# Patient Record
Sex: MALE | Race: OTHER | NOT HISPANIC OR LATINO | URBAN - METROPOLITAN AREA
[De-identification: names, ages, dates, MRNs, and addresses within clinical notes are randomized per-mention and may not be internally consistent; named-entity substitution may affect disease eponyms.]

---

## 2019-01-22 ENCOUNTER — EMERGENCY (EMERGENCY)
Facility: HOSPITAL | Age: 34
LOS: 1 days | Discharge: HOME | End: 2019-01-22
Attending: EMERGENCY MEDICINE
Payer: MEDICAID

## 2019-01-22 VITALS
RESPIRATION RATE: 16 BRPM | OXYGEN SATURATION: 99 % | HEART RATE: 70 BPM | HEIGHT: 66 IN | SYSTOLIC BLOOD PRESSURE: 146 MMHG | TEMPERATURE: 98 F | DIASTOLIC BLOOD PRESSURE: 90 MMHG | WEIGHT: 149.91 LBS

## 2019-01-22 VITALS
DIASTOLIC BLOOD PRESSURE: 84 MMHG | TEMPERATURE: 98 F | SYSTOLIC BLOOD PRESSURE: 132 MMHG | OXYGEN SATURATION: 99 % | HEART RATE: 74 BPM | RESPIRATION RATE: 18 BRPM

## 2019-01-22 LAB
FLU A RESULT: DETECTED
FLU A RESULT: DETECTED
FLUAV AG NPH QL: DETECTED
FLUBV AG NPH QL: SIGNIFICANT CHANGE UP
RSV RESULT: SIGNIFICANT CHANGE UP
RSV RNA RESP QL NAA+PROBE: SIGNIFICANT CHANGE UP

## 2019-01-22 PROCEDURE — 71046 X-RAY EXAM CHEST 2 VIEWS: CPT | Mod: 26

## 2019-01-22 PROCEDURE — 99283 EMERGENCY DEPT VISIT LOW MDM: CPT

## 2019-01-22 PROCEDURE — 87631 RESP VIRUS 3-5 TARGETS: CPT

## 2019-01-22 PROCEDURE — 71046 X-RAY EXAM CHEST 2 VIEWS: CPT

## 2019-01-22 RX ORDER — ACETAMINOPHEN 500 MG
975 TABLET ORAL ONCE
Qty: 0 | Refills: 0 | Status: COMPLETED | OUTPATIENT
Start: 2019-01-22 | End: 2019-01-22

## 2019-01-22 RX ORDER — IBUPROFEN 200 MG
600 TABLET ORAL ONCE
Qty: 0 | Refills: 0 | Status: COMPLETED | OUTPATIENT
Start: 2019-01-22 | End: 2019-01-22

## 2019-01-22 RX ADMIN — Medication 600 MILLIGRAM(S): at 20:36

## 2019-01-22 RX ADMIN — Medication 975 MILLIGRAM(S): at 21:36

## 2019-01-22 RX ADMIN — Medication 600 MILLIGRAM(S): at 21:36

## 2019-01-22 RX ADMIN — Medication 975 MILLIGRAM(S): at 20:36

## 2019-01-22 NOTE — ED ADULT NURSE NOTE - NSIMPLEMENTINTERV_GEN_ALL_ED
Implemented All Universal Safety Interventions:  [place] to call system. Call bell, personal items and telephone within reach. Instruct patient to call for assistance. Room bathroom lighting operational. Non-slip footwear when patient is off stretcher. Physically safe environment: no spills, clutter or unnecessary equipment. Stretcher in lowest position, wheels locked, appropriate side rails in place.

## 2019-01-22 NOTE — ED PROVIDER NOTE - MEDICAL DECISION MAKING DETAILS
Marcia MOFFETT: 32 yo M with no chronic medical problems here for 3-4 days of cough, body aches, fevers, chills. No travel. No sick contacts. Did not get the flu shot. Exam: NAD, RRR, lungs CTA, abd soft, nt, nd skin: no rash, legs: no edema a/p: viral illness/flu, will check CXR, symptomatically treat and check flu.

## 2019-01-22 NOTE — ED PROVIDER NOTE - CARE PLAN
Principal Discharge DX:	URI, acute  Assessment and plan of treatment:	stay hydrated  Take Tylenol 650mg every 6 hrs as needed for pain.  Take Motrin 400-600mg every 6 hrs as needed for pain. Take with food   Follow up with your Primary Care Physician within the next 2-3 days  Bring a copy of your test results with you to your appointment  Continue your current medication regimen  Return to the Emergency Room if you experience new or worsening symptoms Principal Discharge DX:	Influenza A  Assessment and plan of treatment:	stay hydrated  Take Tylenol 650mg every 6 hrs as needed for pain.  Take Motrin 400-600mg every 6 hrs as needed for pain. Take with food   Follow up with your Primary Care Physician within the next 2-3 days  Bring a copy of your test results with you to your appointment  Continue your current medication regimen  Return to the Emergency Room if you experience new or worsening symptoms

## 2019-01-22 NOTE — ED PROVIDER NOTE - NSFOLLOWUPINSTRUCTIONS_ED_ALL_ED_FT
stay hydrated  Take Tylenol 650mg every 6 hrs as needed for pain.  Take Motrin 400-600mg every 6 hrs as needed for pain. Take with food   Follow up with your Primary Care Physician within the next 2-3 days  Bring a copy of your test results with you to your appointment  Continue your current medication regimen  Return to the Emergency Room if you experience new or worsening symptoms

## 2019-01-22 NOTE — ED ADULT NURSE NOTE - OBJECTIVE STATEMENT
33 year old male presented to ED with c/o of cough x4 days. pt denies CP, SOB, nausea/vomiting, numbness/tingling, chills, dizziness, headache, blurred vision, neuro intact. pt a&ox3, lung sounds clear, heart rate regular, abdomen soft nontender nondistended to palp. skin intact. pt currently resting in bed comfortably with significant other at bedside. Will continue to monitor and assess while offering support and reassurance.

## 2019-01-22 NOTE — ED PROVIDER NOTE - OBJECTIVE STATEMENT
32 y/o male otherwise healthy who presents to the ED for 3-4 days of cough, congestion, and significant body aches. states subjective fevers bur didn't measure. no travel or sick contacts. did not get flu shot this year. no rash, vomiting, diarrhea.

## 2019-01-22 NOTE — ED ADULT NURSE NOTE - CHPI ED NUR SYMPTOMS NEG
no decreased eating/drinking/no weakness/no vomiting/no dizziness/no pain/no nausea/no chills/no tingling

## 2022-01-27 NOTE — ED ADULT TRIAGE NOTE - ARRIVAL FROM
Quality 226: Preventive Care And Screening: Tobacco Use: Screening And Cessation Intervention: Patient screened for tobacco use and is an ex/non-smoker
Detail Level: Detailed
Quality 431: Preventive Care And Screening: Unhealthy Alcohol Use - Screening: Patient identified as an unhealthy alcohol user when screened for unhealthy alcohol use using a systematic screening method and received brief counseling
Quality 130: Documentation Of Current Medications In The Medical Record: Current Medications Documented
Home

## 2022-03-30 ENCOUNTER — EMERGENCY (EMERGENCY)
Facility: HOSPITAL | Age: 37
LOS: 1 days | Discharge: HOME | End: 2022-03-30
Attending: EMERGENCY MEDICINE
Payer: MEDICAID

## 2022-03-30 VITALS
SYSTOLIC BLOOD PRESSURE: 127 MMHG | OXYGEN SATURATION: 99 % | HEART RATE: 93 BPM | TEMPERATURE: 98 F | RESPIRATION RATE: 18 BRPM | WEIGHT: 141.98 LBS | HEIGHT: 66 IN | DIASTOLIC BLOOD PRESSURE: 89 MMHG

## 2022-03-30 VITALS
HEART RATE: 90 BPM | SYSTOLIC BLOOD PRESSURE: 128 MMHG | DIASTOLIC BLOOD PRESSURE: 88 MMHG | OXYGEN SATURATION: 99 % | RESPIRATION RATE: 18 BRPM | TEMPERATURE: 98 F

## 2022-03-30 LAB
ALBUMIN SERPL ELPH-MCNC: 4.5 G/DL — SIGNIFICANT CHANGE UP (ref 3.3–5)
ALP SERPL-CCNC: 74 U/L — SIGNIFICANT CHANGE UP (ref 40–120)
ALT FLD-CCNC: 92 U/L — HIGH (ref 10–45)
ANION GAP SERPL CALC-SCNC: 12 MMOL/L — SIGNIFICANT CHANGE UP (ref 5–17)
AST SERPL-CCNC: 38 U/L — SIGNIFICANT CHANGE UP (ref 10–40)
BASOPHILS # BLD AUTO: 0.08 K/UL — SIGNIFICANT CHANGE UP (ref 0–0.2)
BASOPHILS NFR BLD AUTO: 0.9 % — SIGNIFICANT CHANGE UP (ref 0–2)
BILIRUB SERPL-MCNC: 0.5 MG/DL — SIGNIFICANT CHANGE UP (ref 0.2–1.2)
BUN SERPL-MCNC: 16 MG/DL — SIGNIFICANT CHANGE UP (ref 7–23)
CALCIUM SERPL-MCNC: 10 MG/DL — SIGNIFICANT CHANGE UP (ref 8.4–10.5)
CHLORIDE SERPL-SCNC: 102 MMOL/L — SIGNIFICANT CHANGE UP (ref 96–108)
CO2 SERPL-SCNC: 25 MMOL/L — SIGNIFICANT CHANGE UP (ref 22–31)
CREAT SERPL-MCNC: 0.83 MG/DL — SIGNIFICANT CHANGE UP (ref 0.5–1.3)
EGFR: 117 ML/MIN/1.73M2 — SIGNIFICANT CHANGE UP
EOSINOPHIL # BLD AUTO: 0.27 K/UL — SIGNIFICANT CHANGE UP (ref 0–0.5)
EOSINOPHIL NFR BLD AUTO: 2.9 % — SIGNIFICANT CHANGE UP (ref 0–6)
GLUCOSE SERPL-MCNC: 81 MG/DL — SIGNIFICANT CHANGE UP (ref 70–99)
HCT VFR BLD CALC: 37.9 % — LOW (ref 39–50)
HGB BLD-MCNC: 12.3 G/DL — LOW (ref 13–17)
IMM GRANULOCYTES NFR BLD AUTO: 0.9 % — SIGNIFICANT CHANGE UP (ref 0–1.5)
LYMPHOCYTES # BLD AUTO: 3.09 K/UL — SIGNIFICANT CHANGE UP (ref 1–3.3)
LYMPHOCYTES # BLD AUTO: 33.6 % — SIGNIFICANT CHANGE UP (ref 13–44)
MCHC RBC-ENTMCNC: 29.8 PG — SIGNIFICANT CHANGE UP (ref 27–34)
MCHC RBC-ENTMCNC: 32.5 GM/DL — SIGNIFICANT CHANGE UP (ref 32–36)
MCV RBC AUTO: 91.8 FL — SIGNIFICANT CHANGE UP (ref 80–100)
MONOCYTES # BLD AUTO: 0.8 K/UL — SIGNIFICANT CHANGE UP (ref 0–0.9)
MONOCYTES NFR BLD AUTO: 8.7 % — SIGNIFICANT CHANGE UP (ref 2–14)
NEUTROPHILS # BLD AUTO: 4.87 K/UL — SIGNIFICANT CHANGE UP (ref 1.8–7.4)
NEUTROPHILS NFR BLD AUTO: 53 % — SIGNIFICANT CHANGE UP (ref 43–77)
NRBC # BLD: 0 /100 WBCS — SIGNIFICANT CHANGE UP (ref 0–0)
PLATELET # BLD AUTO: 516 K/UL — HIGH (ref 150–400)
POTASSIUM SERPL-MCNC: 4.1 MMOL/L — SIGNIFICANT CHANGE UP (ref 3.5–5.3)
POTASSIUM SERPL-SCNC: 4.1 MMOL/L — SIGNIFICANT CHANGE UP (ref 3.5–5.3)
PROT SERPL-MCNC: 7.5 G/DL — SIGNIFICANT CHANGE UP (ref 6–8.3)
RBC # BLD: 4.13 M/UL — LOW (ref 4.2–5.8)
RBC # FLD: 13.2 % — SIGNIFICANT CHANGE UP (ref 10.3–14.5)
SODIUM SERPL-SCNC: 139 MMOL/L — SIGNIFICANT CHANGE UP (ref 135–145)
WBC # BLD: 9.19 K/UL — SIGNIFICANT CHANGE UP (ref 3.8–10.5)
WBC # FLD AUTO: 9.19 K/UL — SIGNIFICANT CHANGE UP (ref 3.8–10.5)

## 2022-03-30 PROCEDURE — 99284 EMERGENCY DEPT VISIT MOD MDM: CPT | Mod: 25

## 2022-03-30 PROCEDURE — 36415 COLL VENOUS BLD VENIPUNCTURE: CPT

## 2022-03-30 PROCEDURE — 73630 X-RAY EXAM OF FOOT: CPT

## 2022-03-30 PROCEDURE — 73610 X-RAY EXAM OF ANKLE: CPT

## 2022-03-30 PROCEDURE — 73590 X-RAY EXAM OF LOWER LEG: CPT

## 2022-03-30 PROCEDURE — 87040 BLOOD CULTURE FOR BACTERIA: CPT

## 2022-03-30 PROCEDURE — U0003: CPT

## 2022-03-30 PROCEDURE — 73630 X-RAY EXAM OF FOOT: CPT | Mod: 26,RT

## 2022-03-30 PROCEDURE — 73610 X-RAY EXAM OF ANKLE: CPT | Mod: 26,RT

## 2022-03-30 PROCEDURE — 29515 APPLICATION SHORT LEG SPLINT: CPT

## 2022-03-30 PROCEDURE — 73590 X-RAY EXAM OF LOWER LEG: CPT | Mod: 26,RT

## 2022-03-30 PROCEDURE — U0005: CPT

## 2022-03-30 PROCEDURE — 85025 COMPLETE CBC W/AUTO DIFF WBC: CPT

## 2022-03-30 PROCEDURE — 80053 COMPREHEN METABOLIC PANEL: CPT

## 2022-03-30 RX ORDER — ACETAMINOPHEN 500 MG
975 TABLET ORAL ONCE
Refills: 0 | Status: COMPLETED | OUTPATIENT
Start: 2022-03-30 | End: 2022-03-30

## 2022-03-30 RX ADMIN — Medication 975 MILLIGRAM(S): at 22:34

## 2022-03-30 RX ADMIN — Medication 975 MILLIGRAM(S): at 22:17

## 2022-03-30 NOTE — ED PROVIDER NOTE - NS ED ATTENDING STATEMENT MOD
This was a shared visit with the ERIKA. I reviewed and verified the documentation and independently performed the documented:

## 2022-03-30 NOTE — ED PROVIDER NOTE - NSFOLLOWUPINSTRUCTIONS_ED_ALL_ED_FT
1. Follow up with  your PCP within 2-3 days. Follow up with orthopedic or podiatry within 1 week.     [facility] Orthopedic Surgery  Orthopedic Surgery  [address], 3rd & 4th [place] [address]  Phone: [phone]  Fax:        [facility]  Podiatry  [address] [facility] [address] [place] [address]  Phone: [phone]  Fax:     2. Keep splint clean and dry. Do NOT wet. Keep leg elevated. Use ice as needed. Rest. Use crutches to ambulate.   3. Take Ibuprofen (i.e. Motrin, Advil) 600mg every 8 hrs for pain as needed. Take with food.   May alternate with Acetminophen (Tylenol) 650mg every 6 hours for pain as needed.  4. Return to the emergency department if you develop worsening pain, swelling, numbness, tingling, loss of sensation or any other concerning symptoms.

## 2022-03-30 NOTE — ED ADULT NURSE REASSESSMENT NOTE - NS ED NURSE REASSESS COMMENT FT1
Pt seen by Vince.  LUIS FELIPE established and blood specimens obtained and sent to lab.  Medications administered as per provider orders.  Pt educated on plan of care and process.  Pending being seen in main ED.

## 2022-03-30 NOTE — ED PROCEDURE NOTE - CPROC ED TIME OUT STATEMENT1
“Patient's name, , procedure and correct site were confirmed during the [place] Timeout.”
“Patient's name, , procedure and correct site were confirmed during the [place] Timeout.”

## 2022-03-30 NOTE — ED PROVIDER NOTE - PHYSICAL EXAMINATION
CONSTITUTIONAL: Patient is awake, alert and oriented x 3.   HEAD: NCAT  EYES: PERRL bilaterally,   ENT: Airway patent, Nasal mucosa clear.   NECK: Supple, No   LUNGS: CTA B/L, no wheezes, rhonci or rales  HEART: RRR.+S1S2 no murmurs,   MSK: (+) edema and ttp to right anterior foot and shin, (+) ecchymosis to right foot,    SKIN: (+) multiple abrasions to right shin, (+) stitches intact to multiple lacerations;   NEURO:  No focal deficits,
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
## 2022-03-30 NOTE — ED PROVIDER NOTE - NSFOLLOWUPCLINICS_GEN_ALL_ED_FT
[facility] Orthopedic Surgery  Orthopedic Surgery  [address], [address]rd & 4th [place] [address]  Phone: [phone]  Fax:     [facility]  Podiatry  [address] [facility] - 3rd [place] [address]  Phone: [phone]  Fax:

## 2022-03-30 NOTE — ED PROVIDER NOTE - PATIENT PORTAL LINK FT
You can access the FollowMyHealth Patient Portal offered by [facility] by registering at the following website: [email or web address]. By joining [employer]’s FollowMyHealth portal, you will also be able to view your health information using other applications (apps) compatible with our system.

## 2022-03-30 NOTE — ED PROVIDER NOTE - CARE PLAN
1 Principal Discharge DX:	Fracture of fibula   Principal Discharge DX:	Fracture of fibula  Goal:	proximal, right side
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
## 2022-03-31 LAB — SARS-COV-2 RNA SPEC QL NAA+PROBE: SIGNIFICANT CHANGE UP

## 2022-04-05 LAB
CULTURE RESULTS: SIGNIFICANT CHANGE UP
CULTURE RESULTS: SIGNIFICANT CHANGE UP
SPECIMEN SOURCE: SIGNIFICANT CHANGE UP
SPECIMEN SOURCE: SIGNIFICANT CHANGE UP

## 2022-04-11 PROBLEM — Z00.00 ENCOUNTER FOR PREVENTIVE HEALTH EXAMINATION: Status: ACTIVE | Noted: 2022-04-11

## 2022-04-13 ENCOUNTER — OUTPATIENT (OUTPATIENT)
Dept: OUTPATIENT SERVICES | Facility: HOSPITAL | Age: 37
LOS: 1 days | End: 2022-04-13
Payer: MEDICAID

## 2022-04-13 ENCOUNTER — APPOINTMENT (OUTPATIENT)
Dept: PODIATRY | Facility: HOSPITAL | Age: 37
End: 2022-04-13
Payer: MEDICAID

## 2022-04-13 VITALS
HEIGHT: 67 IN | DIASTOLIC BLOOD PRESSURE: 88 MMHG | SYSTOLIC BLOOD PRESSURE: 127 MMHG | HEART RATE: 72 BPM | WEIGHT: 142 LBS | BODY MASS INDEX: 22.29 KG/M2 | TEMPERATURE: 97.4 F

## 2022-04-13 DIAGNOSIS — S82.831S OTHER FRACTURE OF UPPER AND LOWER END OF RIGHT FIBULA, SEQUELA: ICD-10-CM

## 2022-04-13 DIAGNOSIS — M79.609 PAIN IN UNSPECIFIED LIMB: ICD-10-CM

## 2022-04-13 DIAGNOSIS — S82.63XA DISPLACED FRACTURE OF LATERAL MALLEOLUS OF UNSPECIFIED FIBULA, INITIAL ENCOUNTER FOR CLOSED FRACTURE: ICD-10-CM

## 2022-04-13 DIAGNOSIS — I25.2 OLD MYOCARDIAL INFARCTION: ICD-10-CM

## 2022-04-13 PROBLEM — I25.10 ATHEROSCLEROTIC HEART DISEASE OF NATIVE CORONARY ARTERY WITHOUT ANGINA PECTORIS: Chronic | Status: ACTIVE | Noted: 2022-03-30

## 2022-04-13 PROCEDURE — 99202 OFFICE O/P NEW SF 15 MIN: CPT

## 2022-04-13 PROCEDURE — G0463: CPT
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
## 2022-04-13 NOTE — REASON FOR VISIT
[Initial Visit] : an initial visit for [Ankle Pain] : ankle pain [Foot Pain] : foot pain [FreeTextEntry2] : SONDRA brandt fibular fracture

## 2022-04-13 NOTE — PHYSICAL EXAM
[2+] : right foot dorsalis pedis 2+ [Sensation] : the sensory exam was normal to light touch and pinprick [No Focal Deficits] : no focal deficits [Oriented To Time, Place, And Person] : oriented to person, place, and time [Impaired Insight] : insight and judgment were intact [Memory Recent] : recent memory was not impaired [Affect] : the affect was normal [Ankle Swelling (On Exam)] : not present [Varicose Veins Of Lower Extremities] : not present [] : not present [de-identified] : R ankle with pain on palpation to the medial and lateral border, mild pain on palpation to the proximal fibular neck. Ankle ROM intact with 4/5 muscle strength to the R ankle.  [FreeTextEntry1] : R lateral malleolus with dry stable eschar with no clinical signs of infection. L foot with no clinical signs of infection or any open lesions.  [Vibration Dec.] : normal vibratory sensation at the level of the toes [Position Sense Dec.] : normal position sense at the level of the toes [Diminished Throughout Right Foot] : normal sensation with monofilament testing throughout right foot [Diminished Throughout Left Foot] : normal sensation with monofilament testing throughout left foot

## 2022-04-27 ENCOUNTER — APPOINTMENT (OUTPATIENT)
Dept: PODIATRY | Facility: HOSPITAL | Age: 37
End: 2022-04-27
Payer: MEDICAID

## 2022-04-27 ENCOUNTER — RESULT REVIEW (OUTPATIENT)
Age: 37
End: 2022-04-27

## 2022-04-27 ENCOUNTER — OUTPATIENT (OUTPATIENT)
Dept: OUTPATIENT SERVICES | Facility: HOSPITAL | Age: 37
LOS: 1 days | End: 2022-04-27
Payer: MEDICAID

## 2022-04-27 VITALS
HEART RATE: 84 BPM | WEIGHT: 143 LBS | SYSTOLIC BLOOD PRESSURE: 120 MMHG | TEMPERATURE: 97.2 F | DIASTOLIC BLOOD PRESSURE: 87 MMHG | RESPIRATION RATE: 14 BRPM | BODY MASS INDEX: 22.44 KG/M2 | HEIGHT: 67 IN

## 2022-04-27 DIAGNOSIS — S82.831S OTHER FRACTURE OF UPPER AND LOWER END OF RIGHT FIBULA, SEQUELA: ICD-10-CM

## 2022-04-27 DIAGNOSIS — M79.609 PAIN IN UNSPECIFIED LIMB: ICD-10-CM

## 2022-04-27 DIAGNOSIS — S82.63XA DISPLACED FRACTURE OF LATERAL MALLEOLUS OF UNSPECIFIED FIBULA, INITIAL ENCOUNTER FOR CLOSED FRACTURE: ICD-10-CM

## 2022-04-27 PROCEDURE — 73590 X-RAY EXAM OF LOWER LEG: CPT | Mod: 26,RT

## 2022-04-27 PROCEDURE — 73610 X-RAY EXAM OF ANKLE: CPT

## 2022-04-27 PROCEDURE — G0463: CPT

## 2022-04-27 PROCEDURE — 73610 X-RAY EXAM OF ANKLE: CPT | Mod: 26,RT

## 2022-04-27 PROCEDURE — 99212 OFFICE O/P EST SF 10 MIN: CPT

## 2022-04-27 PROCEDURE — 73590 X-RAY EXAM OF LOWER LEG: CPT

## 2022-04-27 NOTE — PHYSICAL EXAM
[2+] : right foot dorsalis pedis 2+ [Sensation] : the sensory exam was normal to light touch and pinprick [No Focal Deficits] : no focal deficits [Oriented To Time, Place, And Person] : oriented to person, place, and time [Impaired Insight] : insight and judgment were intact [Affect] : the affect was normal [Memory Recent] : recent memory was not impaired [Ankle Swelling (On Exam)] : not present [Varicose Veins Of Lower Extremities] : not present [] : not present [de-identified] : R ankle with pain on palpation to the medial and lateral border, mild pain on palpation to the proximal fibular neck. Ankle ROM intact with 4/5 muscle strength to the R ankle.  [FreeTextEntry1] : R lateral malleolus with dry stable eschar with no clinical signs of infection. L foot with no clinical signs of infection or any open lesions.  [Vibration Dec.] : normal vibratory sensation at the level of the toes [Position Sense Dec.] : normal position sense at the level of the toes [Diminished Throughout Right Foot] : normal sensation with monofilament testing throughout right foot [Diminished Throughout Left Foot] : normal sensation with monofilament testing throughout left foot
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
## 2022-05-11 ENCOUNTER — APPOINTMENT (OUTPATIENT)
Dept: PODIATRY | Facility: HOSPITAL | Age: 37
End: 2022-05-11
Payer: MEDICAID

## 2022-05-11 ENCOUNTER — OUTPATIENT (OUTPATIENT)
Dept: OUTPATIENT SERVICES | Facility: HOSPITAL | Age: 37
LOS: 1 days | End: 2022-05-11
Payer: MEDICAID

## 2022-05-11 ENCOUNTER — RESULT REVIEW (OUTPATIENT)
Age: 37
End: 2022-05-11

## 2022-05-11 VITALS
DIASTOLIC BLOOD PRESSURE: 83 MMHG | SYSTOLIC BLOOD PRESSURE: 156 MMHG | WEIGHT: 143 LBS | RESPIRATION RATE: 14 BRPM | TEMPERATURE: 97 F | HEIGHT: 67 IN | BODY MASS INDEX: 22.44 KG/M2 | HEART RATE: 73 BPM

## 2022-05-11 DIAGNOSIS — S82.831S OTHER FRACTURE OF UPPER AND LOWER END OF RIGHT FIBULA, SEQUELA: ICD-10-CM

## 2022-05-11 DIAGNOSIS — M79.609 PAIN IN UNSPECIFIED LIMB: ICD-10-CM

## 2022-05-11 DIAGNOSIS — S82.63XA DISPLACED FRACTURE OF LATERAL MALLEOLUS OF UNSPECIFIED FIBULA, INITIAL ENCOUNTER FOR CLOSED FRACTURE: ICD-10-CM

## 2022-05-11 PROCEDURE — 73590 X-RAY EXAM OF LOWER LEG: CPT | Mod: 26,RT

## 2022-05-11 PROCEDURE — 73610 X-RAY EXAM OF ANKLE: CPT | Mod: 26,RT

## 2022-05-11 PROCEDURE — 99212 OFFICE O/P EST SF 10 MIN: CPT

## 2022-05-11 PROCEDURE — G0463: CPT

## 2022-05-11 PROCEDURE — 73590 X-RAY EXAM OF LOWER LEG: CPT

## 2022-05-11 PROCEDURE — 73610 X-RAY EXAM OF ANKLE: CPT
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
## 2022-05-11 NOTE — PHYSICAL EXAM
[2+] : right foot dorsalis pedis 2+ [Sensation] : the sensory exam was normal to light touch and pinprick [No Focal Deficits] : no focal deficits [Oriented To Time, Place, And Person] : oriented to person, place, and time [Impaired Insight] : insight and judgment were intact [Affect] : the affect was normal [Memory Recent] : recent memory was not impaired [Ankle Swelling (On Exam)] : not present [Varicose Veins Of Lower Extremities] : not present [] : not present [de-identified] : R ankle with pain on palpation to the medial and lateral border, no pain on palpation to the proximal fibular neck. Ankle ROM intact with 4/5 muscle strength to the R ankle.  [FreeTextEntry1] : R lateral malleolus with dry stable eschar with no clinical signs of infection. L foot with no clinical signs of infection or any open lesions.  [Vibration Dec.] : normal vibratory sensation at the level of the toes [Position Sense Dec.] : normal position sense at the level of the toes [Diminished Throughout Right Foot] : normal sensation with monofilament testing throughout right foot [Diminished Throughout Left Foot] : normal sensation with monofilament testing throughout left foot
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
## 2022-05-25 ENCOUNTER — RESULT REVIEW (OUTPATIENT)
Age: 37
End: 2022-05-25

## 2022-05-25 ENCOUNTER — APPOINTMENT (OUTPATIENT)
Dept: PODIATRY | Facility: HOSPITAL | Age: 37
End: 2022-05-25
Payer: MEDICAID

## 2022-05-25 ENCOUNTER — OUTPATIENT (OUTPATIENT)
Dept: OUTPATIENT SERVICES | Facility: HOSPITAL | Age: 37
LOS: 1 days | End: 2022-05-25
Payer: MEDICAID

## 2022-05-25 VITALS
BODY MASS INDEX: 27.48 KG/M2 | SYSTOLIC BLOOD PRESSURE: 113 MMHG | DIASTOLIC BLOOD PRESSURE: 78 MMHG | HEIGHT: 60 IN | HEART RATE: 66 BPM | TEMPERATURE: 97.1 F | WEIGHT: 140 LBS

## 2022-05-25 DIAGNOSIS — S82.831S OTHER FRACTURE OF UPPER AND LOWER END OF RIGHT FIBULA, SEQUELA: ICD-10-CM

## 2022-05-25 DIAGNOSIS — S82.63XA DISPLACED FRACTURE OF LATERAL MALLEOLUS OF UNSPECIFIED FIBULA, INITIAL ENCOUNTER FOR CLOSED FRACTURE: ICD-10-CM

## 2022-05-25 DIAGNOSIS — M79.609 PAIN IN UNSPECIFIED LIMB: ICD-10-CM

## 2022-05-25 PROCEDURE — 73590 X-RAY EXAM OF LOWER LEG: CPT

## 2022-05-25 PROCEDURE — 73610 X-RAY EXAM OF ANKLE: CPT

## 2022-05-25 PROCEDURE — 73610 X-RAY EXAM OF ANKLE: CPT | Mod: 26,RT

## 2022-05-25 PROCEDURE — 99212 OFFICE O/P EST SF 10 MIN: CPT

## 2022-05-25 PROCEDURE — G0463: CPT

## 2022-05-25 PROCEDURE — 73590 X-RAY EXAM OF LOWER LEG: CPT | Mod: 26,RT

## 2022-05-25 NOTE — PHYSICAL EXAM
[2+] : right foot dorsalis pedis 2+ [Sensation] : the sensory exam was normal to light touch and pinprick [No Focal Deficits] : no focal deficits [Oriented To Time, Place, And Person] : oriented to person, place, and time [Impaired Insight] : insight and judgment were intact [Affect] : the affect was normal [Memory Recent] : recent memory was not impaired [Ankle Swelling (On Exam)] : not present [Varicose Veins Of Lower Extremities] : not present [] : not present [de-identified] : R ankle with pain on palpation to the medial and lateral border, no pain on palpation to the proximal fibular neck. Ankle ROM intact with 4/5 muscle strength to the R ankle.  [FreeTextEntry1] : R lateral malleolus with dry stable eschar with no clinical signs of infection. L foot with no clinical signs of infection or any open lesions.  [Vibration Dec.] : normal vibratory sensation at the level of the toes [Position Sense Dec.] : normal position sense at the level of the toes [Diminished Throughout Right Foot] : normal sensation with monofilament testing throughout right foot [Diminished Throughout Left Foot] : normal sensation with monofilament testing throughout left foot
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
## 2022-05-25 NOTE — REASON FOR VISIT
[Ankle Pain] : ankle pain [Foot Pain] : foot pain [Follow-Up Visit] : a follow-up visit for [FreeTextEntry2] : SONDRA brandt fibular fracture

## 2022-06-08 ENCOUNTER — OUTPATIENT (OUTPATIENT)
Dept: OUTPATIENT SERVICES | Facility: HOSPITAL | Age: 37
LOS: 1 days | End: 2022-06-08
Payer: MEDICAID

## 2022-06-08 ENCOUNTER — APPOINTMENT (OUTPATIENT)
Dept: PODIATRY | Facility: HOSPITAL | Age: 37
End: 2022-06-08

## 2022-06-08 ENCOUNTER — RESULT REVIEW (OUTPATIENT)
Age: 37
End: 2022-06-08

## 2022-06-08 VITALS
HEART RATE: 71 BPM | WEIGHT: 140 LBS | DIASTOLIC BLOOD PRESSURE: 79 MMHG | HEIGHT: 60 IN | SYSTOLIC BLOOD PRESSURE: 116 MMHG | BODY MASS INDEX: 27.48 KG/M2 | TEMPERATURE: 97.8 F

## 2022-06-08 DIAGNOSIS — S82.831S OTHER FRACTURE OF UPPER AND LOWER END OF RIGHT FIBULA, SEQUELA: ICD-10-CM

## 2022-06-08 DIAGNOSIS — S82.63XA DISPLACED FRACTURE OF LATERAL MALLEOLUS OF UNSPECIFIED FIBULA, INITIAL ENCOUNTER FOR CLOSED FRACTURE: ICD-10-CM

## 2022-06-08 DIAGNOSIS — M79.609 PAIN IN UNSPECIFIED LIMB: ICD-10-CM

## 2022-06-08 PROCEDURE — G0463: CPT

## 2022-06-08 PROCEDURE — 73590 X-RAY EXAM OF LOWER LEG: CPT

## 2022-06-08 PROCEDURE — 73590 X-RAY EXAM OF LOWER LEG: CPT | Mod: 26,RT

## 2022-06-08 NOTE — PHYSICAL EXAM
[2+] : right foot dorsalis pedis 2+ [Sensation] : the sensory exam was normal to light touch and pinprick [No Focal Deficits] : no focal deficits [Oriented To Time, Place, And Person] : oriented to person, place, and time [Impaired Insight] : insight and judgment were intact [Affect] : the affect was normal [Memory Recent] : recent memory was not impaired [Ankle Swelling (On Exam)] : not present [Varicose Veins Of Lower Extremities] : not present [] : not present [de-identified] : R ankle with pain on palpation to the medial and lateral border, no pain on palpation to the proximal fibular neck. Ankle ROM intact with 4/5 muscle strength to the R ankle.  [FreeTextEntry1] : R lateral malleolus with dry stable eschar with no clinical signs of infection. L foot with no clinical signs of infection or any open lesions.  [Vibration Dec.] : normal vibratory sensation at the level of the toes [Position Sense Dec.] : normal position sense at the level of the toes [Diminished Throughout Right Foot] : normal sensation with monofilament testing throughout right foot [Diminished Throughout Left Foot] : normal sensation with monofilament testing throughout left foot
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
## 2022-06-08 NOTE — REASON FOR VISIT
[Follow-Up Visit] : a follow-up visit for [Ankle Pain] : ankle pain [Foot Pain] : foot pain [FreeTextEntry2] : SONDRA rbandt fibular fracture

## 2022-06-22 ENCOUNTER — RESULT REVIEW (OUTPATIENT)
Age: 37
End: 2022-06-22

## 2022-06-22 ENCOUNTER — APPOINTMENT (OUTPATIENT)
Dept: PODIATRY | Facility: HOSPITAL | Age: 37
End: 2022-06-22
Payer: MEDICAID

## 2022-06-22 ENCOUNTER — OUTPATIENT (OUTPATIENT)
Dept: OUTPATIENT SERVICES | Facility: HOSPITAL | Age: 37
LOS: 1 days | End: 2022-06-22
Payer: MEDICAID

## 2022-06-22 VITALS
RESPIRATION RATE: 16 BRPM | TEMPERATURE: 98 F | HEART RATE: 70 BPM | WEIGHT: 140 LBS | HEIGHT: 67 IN | BODY MASS INDEX: 21.97 KG/M2 | SYSTOLIC BLOOD PRESSURE: 116 MMHG | DIASTOLIC BLOOD PRESSURE: 81 MMHG

## 2022-06-22 DIAGNOSIS — M79.609 PAIN IN UNSPECIFIED LIMB: ICD-10-CM

## 2022-06-22 DIAGNOSIS — S82.63XA DISPLACED FRACTURE OF LATERAL MALLEOLUS OF UNSPECIFIED FIBULA, INITIAL ENCOUNTER FOR CLOSED FRACTURE: ICD-10-CM

## 2022-06-22 DIAGNOSIS — S82.831S OTHER FRACTURE OF UPPER AND LOWER END OF RIGHT FIBULA, SEQUELA: ICD-10-CM

## 2022-06-22 PROCEDURE — 73610 X-RAY EXAM OF ANKLE: CPT

## 2022-06-22 PROCEDURE — 99212 OFFICE O/P EST SF 10 MIN: CPT

## 2022-06-22 PROCEDURE — G0463: CPT

## 2022-06-22 PROCEDURE — 73610 X-RAY EXAM OF ANKLE: CPT | Mod: 26,RT

## 2022-06-22 PROCEDURE — 73590 X-RAY EXAM OF LOWER LEG: CPT | Mod: 26,RT

## 2022-06-22 PROCEDURE — 73590 X-RAY EXAM OF LOWER LEG: CPT
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
## 2022-06-22 NOTE — PHYSICAL EXAM
[2+] : right foot dorsalis pedis 2+ [Sensation] : the sensory exam was normal to light touch and pinprick [No Focal Deficits] : no focal deficits [Oriented To Time, Place, And Person] : oriented to person, place, and time [Impaired Insight] : insight and judgment were intact [Affect] : the affect was normal [Memory Recent] : recent memory was not impaired [Ankle Swelling (On Exam)] : not present [Varicose Veins Of Lower Extremities] : not present [] : not present [de-identified] : R ankle with mild pain on palpation to the medial and lateral border, mild pain on palpation to the proximal fibular neck. Ankle ROM intact with 5/5 muscle strength to the R ankle.  [FreeTextEntry1] : R lateral malleolus with dry stable eschar with no clinical signs of infection. L foot with no clinical signs of infection or any open lesions.  [Vibration Dec.] : normal vibratory sensation at the level of the toes [Position Sense Dec.] : normal position sense at the level of the toes [Diminished Throughout Right Foot] : normal sensation with monofilament testing throughout right foot [Diminished Throughout Left Foot] : normal sensation with monofilament testing throughout left foot
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
## 2022-06-22 NOTE — REASON FOR VISIT
[Follow-Up Visit] : a follow-up visit for [Ankle Pain] : ankle pain [Foot Pain] : foot pain [FreeTextEntry2] : SONDRA brandt fibular fracture

## 2022-07-06 ENCOUNTER — RESULT REVIEW (OUTPATIENT)
Age: 37
End: 2022-07-06

## 2022-07-06 ENCOUNTER — APPOINTMENT (OUTPATIENT)
Dept: PODIATRY | Facility: HOSPITAL | Age: 37
End: 2022-07-06

## 2022-07-06 ENCOUNTER — OUTPATIENT (OUTPATIENT)
Dept: OUTPATIENT SERVICES | Facility: HOSPITAL | Age: 37
LOS: 1 days | End: 2022-07-06
Payer: MEDICAID

## 2022-07-06 VITALS
SYSTOLIC BLOOD PRESSURE: 121 MMHG | RESPIRATION RATE: 14 BRPM | HEIGHT: 67 IN | TEMPERATURE: 96.6 F | DIASTOLIC BLOOD PRESSURE: 83 MMHG | HEART RATE: 69 BPM | WEIGHT: 140 LBS | BODY MASS INDEX: 21.97 KG/M2

## 2022-07-06 DIAGNOSIS — S82.63XA DISPLACED FRACTURE OF LATERAL MALLEOLUS OF UNSPECIFIED FIBULA, INITIAL ENCOUNTER FOR CLOSED FRACTURE: ICD-10-CM

## 2022-07-06 DIAGNOSIS — S82.831S OTHER FRACTURE OF UPPER AND LOWER END OF RIGHT FIBULA, SEQUELA: ICD-10-CM

## 2022-07-06 DIAGNOSIS — M79.609 PAIN IN UNSPECIFIED LIMB: ICD-10-CM

## 2022-07-06 PROCEDURE — 73610 X-RAY EXAM OF ANKLE: CPT | Mod: 26,RT

## 2022-07-06 PROCEDURE — 99212 OFFICE O/P EST SF 10 MIN: CPT

## 2022-07-06 PROCEDURE — G0463: CPT

## 2022-07-06 PROCEDURE — 73610 X-RAY EXAM OF ANKLE: CPT

## 2022-07-06 NOTE — PHYSICAL EXAM
[2+] : right foot dorsalis pedis 2+ [Sensation] : the sensory exam was normal to light touch and pinprick [No Focal Deficits] : no focal deficits [Oriented To Time, Place, And Person] : oriented to person, place, and time [Impaired Insight] : insight and judgment were intact [Affect] : the affect was normal [Memory Recent] : recent memory was not impaired [Ankle Swelling (On Exam)] : not present [Varicose Veins Of Lower Extremities] : not present [] : not present [de-identified] : R ankle with mild pain on palpation to the medial and lateral border, mild pain on palpation to the proximal fibular neck. Ankle ROM intact with 5/5 muscle strength to the R ankle.  [FreeTextEntry1] : R lateral malleolus with dry stable eschar with no clinical signs of infection. L foot with no clinical signs of infection or any open lesions.  [Vibration Dec.] : normal vibratory sensation at the level of the toes [Position Sense Dec.] : normal position sense at the level of the toes [Diminished Throughout Right Foot] : normal sensation with monofilament testing throughout right foot [Diminished Throughout Left Foot] : normal sensation with monofilament testing throughout left foot
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
## 2022-07-20 ENCOUNTER — OUTPATIENT (OUTPATIENT)
Dept: OUTPATIENT SERVICES | Facility: HOSPITAL | Age: 37
LOS: 1 days | End: 2022-07-20
Payer: MEDICAID

## 2022-07-20 ENCOUNTER — APPOINTMENT (OUTPATIENT)
Dept: PODIATRY | Facility: HOSPITAL | Age: 37
End: 2022-07-20

## 2022-07-20 VITALS
BODY MASS INDEX: 21.97 KG/M2 | RESPIRATION RATE: 14 BRPM | WEIGHT: 140 LBS | DIASTOLIC BLOOD PRESSURE: 81 MMHG | TEMPERATURE: 97.6 F | SYSTOLIC BLOOD PRESSURE: 112 MMHG | HEIGHT: 67 IN | HEART RATE: 76 BPM

## 2022-07-20 DIAGNOSIS — S82.63XA DISPLACED FRACTURE OF LATERAL MALLEOLUS OF UNSPECIFIED FIBULA, INITIAL ENCOUNTER FOR CLOSED FRACTURE: ICD-10-CM

## 2022-07-20 DIAGNOSIS — M79.609 PAIN IN UNSPECIFIED LIMB: ICD-10-CM

## 2022-07-20 DIAGNOSIS — S82.831S OTHER FRACTURE OF UPPER AND LOWER END OF RIGHT FIBULA, SEQUELA: ICD-10-CM

## 2022-07-20 PROCEDURE — G0463: CPT
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
## 2022-07-20 NOTE — PHYSICAL EXAM
[2+] : right foot dorsalis pedis 2+ [Sensation] : the sensory exam was normal to light touch and pinprick [No Focal Deficits] : no focal deficits [Oriented To Time, Place, And Person] : oriented to person, place, and time [Impaired Insight] : insight and judgment were intact [Affect] : the affect was normal [Memory Recent] : recent memory was not impaired [Ankle Swelling (On Exam)] : not present [Varicose Veins Of Lower Extremities] : not present [] : not present [de-identified] : R ankle with mild pain on palpation to the medial and lateral border, mild pain on palpation to the proximal fibular neck. Ankle ROM intact with 5/5 muscle strength to the R ankle.  [FreeTextEntry1] : R lateral malleolus with no clinical signs of infection. L foot with no clinical signs of infection or any open lesions.  [Vibration Dec.] : normal vibratory sensation at the level of the toes [Position Sense Dec.] : normal position sense at the level of the toes [Diminished Throughout Right Foot] : normal sensation with monofilament testing throughout right foot [Diminished Throughout Left Foot] : normal sensation with monofilament testing throughout left foot

## 2022-08-17 ENCOUNTER — APPOINTMENT (OUTPATIENT)
Dept: PODIATRY | Facility: HOSPITAL | Age: 37
End: 2022-08-17

## 2022-11-28 ENCOUNTER — INPATIENT (INPATIENT)
Facility: HOSPITAL | Age: 37
LOS: 0 days | Discharge: HOME | DRG: 87 | End: 2022-11-29
Attending: NEUROLOGICAL SURGERY | Admitting: NEUROLOGICAL SURGERY
Payer: MEDICAID

## 2022-11-28 VITALS
TEMPERATURE: 98 F | HEART RATE: 76 BPM | DIASTOLIC BLOOD PRESSURE: 74 MMHG | HEIGHT: 66 IN | RESPIRATION RATE: 17 BRPM | OXYGEN SATURATION: 98 % | WEIGHT: 147.05 LBS | SYSTOLIC BLOOD PRESSURE: 153 MMHG

## 2022-11-28 DIAGNOSIS — S06.33AA CONTUSION AND LACERATION OF CEREBRUM, UNSPECIFIED, WITH LOSS OF CONSCIOUSNESS STATUS UNKNOWN, INITIAL ENCOUNTER: ICD-10-CM

## 2022-11-28 LAB
ALBUMIN SERPL ELPH-MCNC: 4.2 G/DL — SIGNIFICANT CHANGE UP (ref 3.3–5)
ALP SERPL-CCNC: 65 U/L — SIGNIFICANT CHANGE UP (ref 40–120)
ALT FLD-CCNC: 22 U/L — SIGNIFICANT CHANGE UP (ref 10–45)
ANION GAP SERPL CALC-SCNC: 11 MMOL/L — SIGNIFICANT CHANGE UP (ref 5–17)
APTT BLD: 33 SEC — SIGNIFICANT CHANGE UP (ref 27.5–35.5)
AST SERPL-CCNC: 14 U/L — SIGNIFICANT CHANGE UP (ref 10–40)
BASOPHILS # BLD AUTO: 0.04 K/UL — SIGNIFICANT CHANGE UP (ref 0–0.2)
BASOPHILS NFR BLD AUTO: 0.4 % — SIGNIFICANT CHANGE UP (ref 0–2)
BILIRUB SERPL-MCNC: 0.5 MG/DL — SIGNIFICANT CHANGE UP (ref 0.2–1.2)
BLD GP AB SCN SERPL QL: NEGATIVE — SIGNIFICANT CHANGE UP
BUN SERPL-MCNC: 6 MG/DL — LOW (ref 7–23)
CALCIUM SERPL-MCNC: 8.9 MG/DL — SIGNIFICANT CHANGE UP (ref 8.4–10.5)
CHLORIDE SERPL-SCNC: 106 MMOL/L — SIGNIFICANT CHANGE UP (ref 96–108)
CO2 SERPL-SCNC: 25 MMOL/L — SIGNIFICANT CHANGE UP (ref 22–31)
CREAT SERPL-MCNC: 0.76 MG/DL — SIGNIFICANT CHANGE UP (ref 0.5–1.3)
EGFR: 119 ML/MIN/1.73M2 — SIGNIFICANT CHANGE UP
EOSINOPHIL # BLD AUTO: 0.09 K/UL — SIGNIFICANT CHANGE UP (ref 0–0.5)
EOSINOPHIL NFR BLD AUTO: 0.8 % — SIGNIFICANT CHANGE UP (ref 0–6)
FLUAV AG NPH QL: SIGNIFICANT CHANGE UP
FLUBV AG NPH QL: SIGNIFICANT CHANGE UP
GLUCOSE SERPL-MCNC: 96 MG/DL — SIGNIFICANT CHANGE UP (ref 70–99)
HCT VFR BLD CALC: 39.4 % — SIGNIFICANT CHANGE UP (ref 39–50)
HGB BLD-MCNC: 13.3 G/DL — SIGNIFICANT CHANGE UP (ref 13–17)
IMM GRANULOCYTES NFR BLD AUTO: 0.4 % — SIGNIFICANT CHANGE UP (ref 0–0.9)
INR BLD: 1.13 RATIO — SIGNIFICANT CHANGE UP (ref 0.88–1.16)
LYMPHOCYTES # BLD AUTO: 28.4 % — SIGNIFICANT CHANGE UP (ref 13–44)
LYMPHOCYTES # BLD AUTO: 3.12 K/UL — SIGNIFICANT CHANGE UP (ref 1–3.3)
MCHC RBC-ENTMCNC: 30 PG — SIGNIFICANT CHANGE UP (ref 27–34)
MCHC RBC-ENTMCNC: 33.8 GM/DL — SIGNIFICANT CHANGE UP (ref 32–36)
MCV RBC AUTO: 88.9 FL — SIGNIFICANT CHANGE UP (ref 80–100)
MONOCYTES # BLD AUTO: 0.96 K/UL — HIGH (ref 0–0.9)
MONOCYTES NFR BLD AUTO: 8.8 % — SIGNIFICANT CHANGE UP (ref 2–14)
NEUTROPHILS # BLD AUTO: 6.72 K/UL — SIGNIFICANT CHANGE UP (ref 1.8–7.4)
NEUTROPHILS NFR BLD AUTO: 61.2 % — SIGNIFICANT CHANGE UP (ref 43–77)
NRBC # BLD: 0 /100 WBCS — SIGNIFICANT CHANGE UP (ref 0–0)
PA ADP PRP-ACNC: 275 PRU — SIGNIFICANT CHANGE UP (ref 194–417)
PLATELET # BLD AUTO: 295 K/UL — SIGNIFICANT CHANGE UP (ref 150–400)
PLATELET RESPONSE ASPIRIN RESULT: 482 ARU — SIGNIFICANT CHANGE UP (ref 350–700)
POTASSIUM SERPL-MCNC: 3.5 MMOL/L — SIGNIFICANT CHANGE UP (ref 3.5–5.3)
POTASSIUM SERPL-SCNC: 3.5 MMOL/L — SIGNIFICANT CHANGE UP (ref 3.5–5.3)
PROT SERPL-MCNC: 7 G/DL — SIGNIFICANT CHANGE UP (ref 6–8.3)
PROTHROM AB SERPL-ACNC: 13 SEC — SIGNIFICANT CHANGE UP (ref 10.5–13.4)
RBC # BLD: 4.43 M/UL — SIGNIFICANT CHANGE UP (ref 4.2–5.8)
RBC # FLD: 12.6 % — SIGNIFICANT CHANGE UP (ref 10.3–14.5)
RH IG SCN BLD-IMP: POSITIVE — SIGNIFICANT CHANGE UP
RSV RNA NPH QL NAA+NON-PROBE: SIGNIFICANT CHANGE UP
SARS-COV-2 RNA SPEC QL NAA+PROBE: SIGNIFICANT CHANGE UP
SODIUM SERPL-SCNC: 142 MMOL/L — SIGNIFICANT CHANGE UP (ref 135–145)
WBC # BLD: 10.97 K/UL — HIGH (ref 3.8–10.5)
WBC # FLD AUTO: 10.97 K/UL — HIGH (ref 3.8–10.5)

## 2022-11-28 PROCEDURE — 70450 CT HEAD/BRAIN W/O DYE: CPT | Mod: 26,MA

## 2022-11-28 PROCEDURE — 99232 SBSQ HOSP IP/OBS MODERATE 35: CPT

## 2022-11-28 PROCEDURE — 99291 CRITICAL CARE FIRST HOUR: CPT

## 2022-11-28 PROCEDURE — 70450 CT HEAD/BRAIN W/O DYE: CPT | Mod: 26,77

## 2022-11-28 RX ORDER — SODIUM CHLORIDE 9 MG/ML
1000 INJECTION INTRAMUSCULAR; INTRAVENOUS; SUBCUTANEOUS ONCE
Refills: 0 | Status: COMPLETED | OUTPATIENT
Start: 2022-11-28 | End: 2022-11-28

## 2022-11-28 RX ORDER — METOPROLOL TARTRATE 50 MG
25 TABLET ORAL DAILY
Refills: 0 | Status: DISCONTINUED | OUTPATIENT
Start: 2022-11-28 | End: 2022-11-29

## 2022-11-28 RX ORDER — SENNA PLUS 8.6 MG/1
2 TABLET ORAL AT BEDTIME
Refills: 0 | Status: DISCONTINUED | OUTPATIENT
Start: 2022-11-28 | End: 2022-11-29

## 2022-11-28 RX ORDER — OXYCODONE HYDROCHLORIDE 5 MG/1
10 TABLET ORAL EVERY 4 HOURS
Refills: 0 | Status: DISCONTINUED | OUTPATIENT
Start: 2022-11-28 | End: 2022-11-29

## 2022-11-28 RX ORDER — ACETAMINOPHEN 500 MG
1000 TABLET ORAL ONCE
Refills: 0 | Status: COMPLETED | OUTPATIENT
Start: 2022-11-28 | End: 2022-11-28

## 2022-11-28 RX ORDER — ATORVASTATIN CALCIUM 80 MG/1
80 TABLET, FILM COATED ORAL AT BEDTIME
Refills: 0 | Status: DISCONTINUED | OUTPATIENT
Start: 2022-11-28 | End: 2022-11-29

## 2022-11-28 RX ORDER — METOPROLOL TARTRATE 50 MG
1 TABLET ORAL
Qty: 0 | Refills: 0 | DISCHARGE

## 2022-11-28 RX ORDER — METOCLOPRAMIDE HCL 10 MG
10 TABLET ORAL ONCE
Refills: 0 | Status: COMPLETED | OUTPATIENT
Start: 2022-11-28 | End: 2022-11-28

## 2022-11-28 RX ORDER — ACETAMINOPHEN 500 MG
650 TABLET ORAL EVERY 6 HOURS
Refills: 0 | Status: DISCONTINUED | OUTPATIENT
Start: 2022-11-28 | End: 2022-11-29

## 2022-11-28 RX ORDER — ONDANSETRON 8 MG/1
4 TABLET, FILM COATED ORAL EVERY 6 HOURS
Refills: 0 | Status: DISCONTINUED | OUTPATIENT
Start: 2022-11-28 | End: 2022-11-29

## 2022-11-28 RX ORDER — OXYCODONE HYDROCHLORIDE 5 MG/1
5 TABLET ORAL EVERY 4 HOURS
Refills: 0 | Status: DISCONTINUED | OUTPATIENT
Start: 2022-11-28 | End: 2022-11-29

## 2022-11-28 RX ORDER — POLYETHYLENE GLYCOL 3350 17 G/17G
17 POWDER, FOR SOLUTION ORAL DAILY
Refills: 0 | Status: DISCONTINUED | OUTPATIENT
Start: 2022-11-28 | End: 2022-11-29

## 2022-11-28 RX ORDER — LEVETIRACETAM 250 MG/1
500 TABLET, FILM COATED ORAL
Refills: 0 | Status: DISCONTINUED | OUTPATIENT
Start: 2022-11-28 | End: 2022-11-29

## 2022-11-28 RX ADMIN — Medication 10 MILLIGRAM(S): at 12:49

## 2022-11-28 RX ADMIN — SODIUM CHLORIDE 1000 MILLILITER(S): 9 INJECTION INTRAMUSCULAR; INTRAVENOUS; SUBCUTANEOUS at 12:49

## 2022-11-28 RX ADMIN — OXYCODONE HYDROCHLORIDE 10 MILLIGRAM(S): 5 TABLET ORAL at 21:40

## 2022-11-28 RX ADMIN — LEVETIRACETAM 500 MILLIGRAM(S): 250 TABLET, FILM COATED ORAL at 15:10

## 2022-11-28 RX ADMIN — Medication 400 MILLIGRAM(S): at 12:48
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
## 2022-11-28 NOTE — ED PROVIDER NOTE - NS ED ROS FT
CONSTITUTIONAL: No fevers, chills, fatigue, dizziness, weakness, unexpected weight change  EYES: No double vision, blurry vision  ENT: No ear pain, nasal congestion, runny nose, sore throat  CV: No chest pain, palpitations  PULM: No cough, shortness of breath  GI: No abdominal pain, nausea, vomiting, diarrhea, constipation  : No dysuria, polyuria, hematuria  SKIN: No rashes, swelling  MSK: No muscle aches  NEURO: + headache, no paresthesias  PSYCHIATRIC: Denies suicidal, homicidal ideations. No auditory, visual, tactile hallucinations

## 2022-11-28 NOTE — ED PROVIDER NOTE - PROGRESS NOTE DETAILS
CTH shows likely hemorrhagic contusion of inferior frontal lobe. paged neurosurgery who recommends keppra 500mg PO BID. States to get rpt CTH in 4 hours post first CTH. Neurosurgery to place order for "ARU" and "PRU".   Patient states last aspirin use was 2 days ago, prior to fall. States HA improved with tylenol and reglan. Will continue to monitor. Admitted to neurosurgery.
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
## 2022-11-28 NOTE — ED ADULT NURSE NOTE - NSIMPLEMENTINTERV_GEN_ALL_ED
Implemented All Universal Safety Interventions:  [place] to call system. Call bell, personal items and telephone within reach. Instruct patient to call for assistance. Room bathroom lighting operational. Non-slip footwear when patient is off stretcher. Physically safe environment: no spills, clutter or unnecessary equipment. Stretcher in lowest position, wheels locked, appropriate side rails in place.

## 2022-11-28 NOTE — ED PROVIDER NOTE - PHYSICAL EXAMINATION
GENERAL: Vital signs are within normal limits  EYES: Conjunctiva noninjected or pale, sclera anicteric  HENT: NC/AT, moist mucous membranes  NECK: Supple, trachea midline  LUNG: Nonlabored respirations, no wheezes, rales  CV: RRR, Pulses- Radial/dorsalis pedis: 2+ bilateral and equal  ABDOMEN: Nondistended, nontender  MSK: No visible deformities, nontender extremities  SKIN: No rashes, bruises  NEURO: AAOx4 (to person, place, time, event), no tremor, steady gait, normal strength and sensation in all extremities, normal coordination  PSYCH: Normal mood and affect

## 2022-11-28 NOTE — ED PROVIDER NOTE - CRITICAL CARE ATTENDING CONTRIBUTION TO CARE
I have personally provided 30 minutes of critical care concurrently with the resident(s) and/or ACP(s), excluding time spent on separate procedures.

## 2022-11-29 ENCOUNTER — TRANSCRIPTION ENCOUNTER (OUTPATIENT)
Age: 37
End: 2022-11-29

## 2022-11-29 VITALS
SYSTOLIC BLOOD PRESSURE: 128 MMHG | DIASTOLIC BLOOD PRESSURE: 81 MMHG | OXYGEN SATURATION: 99 % | TEMPERATURE: 98 F | HEART RATE: 77 BPM | RESPIRATION RATE: 18 BRPM

## 2022-11-29 PROCEDURE — 36415 COLL VENOUS BLD VENIPUNCTURE: CPT

## 2022-11-29 PROCEDURE — 80053 COMPREHEN METABOLIC PANEL: CPT

## 2022-11-29 PROCEDURE — 99285 EMERGENCY DEPT VISIT HI MDM: CPT | Mod: 25

## 2022-11-29 PROCEDURE — 86901 BLOOD TYPING SEROLOGIC RH(D): CPT

## 2022-11-29 PROCEDURE — 85610 PROTHROMBIN TIME: CPT

## 2022-11-29 PROCEDURE — 96375 TX/PRO/DX INJ NEW DRUG ADDON: CPT

## 2022-11-29 PROCEDURE — 86850 RBC ANTIBODY SCREEN: CPT

## 2022-11-29 PROCEDURE — 87637 SARSCOV2&INF A&B&RSV AMP PRB: CPT

## 2022-11-29 PROCEDURE — 86900 BLOOD TYPING SEROLOGIC ABO: CPT

## 2022-11-29 PROCEDURE — 85025 COMPLETE CBC W/AUTO DIFF WBC: CPT

## 2022-11-29 PROCEDURE — 70450 CT HEAD/BRAIN W/O DYE: CPT | Mod: 26

## 2022-11-29 PROCEDURE — 96374 THER/PROPH/DIAG INJ IV PUSH: CPT

## 2022-11-29 PROCEDURE — 70450 CT HEAD/BRAIN W/O DYE: CPT

## 2022-11-29 PROCEDURE — 85730 THROMBOPLASTIN TIME PARTIAL: CPT

## 2022-11-29 PROCEDURE — G0378: CPT

## 2022-11-29 PROCEDURE — 85576 BLOOD PLATELET AGGREGATION: CPT

## 2022-11-29 RX ORDER — ATORVASTATIN CALCIUM 80 MG/1
1 TABLET, FILM COATED ORAL
Qty: 0 | Refills: 0 | DISCHARGE

## 2022-11-29 RX ORDER — ACETAMINOPHEN 500 MG
2 TABLET ORAL
Qty: 0 | Refills: 0 | DISCHARGE
Start: 2022-11-29

## 2022-11-29 RX ORDER — LEVETIRACETAM 250 MG/1
1 TABLET, FILM COATED ORAL
Qty: 12 | Refills: 0
Start: 2022-11-29 | End: 2022-12-04

## 2022-11-29 RX ORDER — ATORVASTATIN CALCIUM 80 MG/1
1 TABLET, FILM COATED ORAL
Qty: 0 | Refills: 0 | DISCHARGE
Start: 2022-11-29

## 2022-11-29 RX ADMIN — SENNA PLUS 2 TABLET(S): 8.6 TABLET ORAL at 00:27

## 2022-11-29 RX ADMIN — Medication 25 MILLIGRAM(S): at 05:16

## 2022-11-29 RX ADMIN — Medication 650 MILLIGRAM(S): at 05:45

## 2022-11-29 RX ADMIN — Medication 650 MILLIGRAM(S): at 05:15

## 2022-11-29 RX ADMIN — ATORVASTATIN CALCIUM 80 MILLIGRAM(S): 80 TABLET, FILM COATED ORAL at 00:25

## 2022-11-29 RX ADMIN — LEVETIRACETAM 500 MILLIGRAM(S): 250 TABLET, FILM COATED ORAL at 05:16

## 2022-11-29 NOTE — DISCHARGE NOTE PROVIDER - NSDCCPCAREPLAN_GEN_ALL_CORE_FT
PRINCIPAL DISCHARGE DIAGNOSIS  Diagnosis: Focal hemorrhagic contusion of cerebrum  Assessment and Plan of Treatment: Continue taking Keppra (Levetiracetam) 500 mg twice a day for a total of 7 days.   You may take Tylenol (acetaminophen) as needed for pain   PLEASE FOLLOW UP WITH NEUROSURGEON DR. NEVILLE IN 1 WEEK   Please make all necessary appointments and follow up. Please DO NOT take any Aspirin and NSAIDs (Advil, Aleve, Motrin, Ibuprofen) until cleared by your Neurosurgeon. Please DO NOT do any heavy lifting, bending, twisting and straining. Please come to the emergency room for any of the following: altered mental status, seizures, pain uncontrolled by pain medications, fevers, chest pain and shortness of breath.  You have been started on a new medication, keppra.  Keppra helps control and prevent seizures.  The most common side effects include diarrhea or constipation, excessive sleepiness, irritability and mood changes.  Rare and sometimes serious side effects include rash.        SECONDARY DISCHARGE DIAGNOSES  Diagnosis: CAD (coronary artery disease)  Assessment and Plan of Treatment: DO NOT take Aspirin until cleared by your Neurosurgeon Dr. Neville   Please follow up with your Cardiologist in 1-2 weeks   Please follow up with your primary care provider in 1-2 weeks       Diagnosis: Hypertension  Assessment and Plan of Treatment: Continue taking Metoprolol Succinate ER 25 mg daily       Diagnosis: HLD (hyperlipidemia)  Assessment and Plan of Treatment: Continue taking atorvastatin 80 mg daily   Please follow up with your Primary Care Provider in 1-2 weeks     PRINCIPAL DISCHARGE DIAGNOSIS  Diagnosis: Focal hemorrhagic contusion of cerebrum  Assessment and Plan of Treatment: Continue taking Keppra (Levetiracetam) 500 mg twice a day for a total of 7 days.   You may take Tylenol (acetaminophen) as needed for pain   PLEASE FOLLOW UP WITH NEUROSURGEON DR. NEVILLE IN 1 WEEK   Please make all necessary appointments and follow up.   **Please DO NOT take any Aspirin and NSAIDs (Advil, Aleve, Motrin, Ibuprofen) until cleared by your Neurosurgeon. Please DO NOT do any heavy lifting, bending, twisting and straining. Please come to the emergency room for any of the following: altered mental status, seizures, pain uncontrolled by pain medications, fevers, chest pain and shortness of breath.  You have been started on a new medication, keppra.  Keppra helps control and prevent seizures.  The most common side effects include diarrhea or constipation, excessive sleepiness, irritability and mood changes.  Rare and sometimes serious side effects include rash.        SECONDARY DISCHARGE DIAGNOSES  Diagnosis: CAD (coronary artery disease)  Assessment and Plan of Treatment: DO NOT take Aspirin until cleared by your Neurosurgeon Dr. Neville   Please follow up with your Cardiologist in 1-2 weeks   Please follow up with your primary care provider in 1-2 weeks       Diagnosis: Hypertension  Assessment and Plan of Treatment: Continue taking Metoprolol Succinate ER 25 mg daily   Please follow up with Primary care provider in 1-2 weeks       Diagnosis: HLD (hyperlipidemia)  Assessment and Plan of Treatment: Continue taking atorvastatin 80 mg daily   Please follow up with your Primary Care Provider in 1-2 weeks

## 2022-11-29 NOTE — DISCHARGE NOTE PROVIDER - NSDCMRMEDTOKEN_GEN_ALL_CORE_FT
atorvastatin 80 mg oral tablet: 1 tab(s) orally once a day  metoprolol succinate 25 mg oral tablet, extended release: 1 tab(s) orally once a day   acetaminophen 325 mg oral tablet: 2 tab(s) orally every 6 hours, As needed, Temp greater or equal to 38C (100.4F), Mild Pain (1 - 3)  Lipitor 80 mg oral tablet: 1 tab(s) orally once a day (at bedtime)  metoprolol succinate 25 mg oral tablet, extended release: 1 tab(s) orally once a day

## 2022-11-29 NOTE — DISCHARGE NOTE NURSING/CASE MANAGEMENT/SOCIAL WORK - PATIENT PORTAL LINK FT
You can access the FollowMyHealth Patient Portal offered by [facility] by registering at the following website: [email or web address]. By joining [employer]’s FollowMyHealth portal, you will also be able to view your health information using other applications (apps) compatible with our system.
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
## 2022-11-29 NOTE — PROGRESS NOTE ADULT - ASSESSMENT
ASSESSMENT: 37M on ASA 81 (last taken 2 days ago, stopped plavix 3 weeks ago), hx of HTN, HLD, cardiac stents x2 (7/18/2020, family hx) s/p fall Friday night in the setting of alcohol intoxication.  States he banged his head but did not suffer loss of consciousness as far as he knows; does not remember what happened. Vomited multiple times yesterday. CT shows bifrontal hemorrhage contusion (L>R)    NEURO:  Neurochecks q4H   CT head interval 4H stable. Repeat CT Head this AM- follow up read   Keppra 500 BID seizure ppx   pain control prn   PT eval     PULM:  Incentive spirometry, Mobilize as tolerated  >98% on RA     CV:  SBP goal    hx of cardiac stents, continue holding ASA   continue home metoprolol ER 25 qd   continue home lipitor 80 mg     RENAL:  IVL, voiding   BUN/Cr stable     GI:  Regular diet   Bowel regimen Miralax & Senna     ENDO:   Goal euglycemia (-180)    HEME/ONC:  VTE prophylaxis: [x] SCDs  [x] hold chemoprophylaxis due to b/l frontal contusion     ID:  afebrile  covid neg 11/28     plan d/w Dr. Russ   Spectra [id number]     ASSESSMENT: 37M on ASA 81 (last taken 2 days ago, stopped plavix 3 weeks ago), hx of HTN, HLD, cardiac stents x2 (7/18/2020, family hx) s/p fall Friday night in the setting of alcohol intoxication w/o LOC. CT shows bifrontal hemorrhage contusion (L>R)    NEURO:  Neurochecks q4H   CT head interval 4H stable. Repeat CT Head this AM- follow up read   Keppra 500 BID seizure ppx   pain control prn   PT eval     PULM:  Incentive spirometry, Mobilize as tolerated  >98% on RA     CV:  SBP goal    hx of cardiac stents, continue holding ASA   continue home metoprolol ER 25 qd   continue home lipitor 80 mg     RENAL:  IVL, voiding   BUN/Cr stable     GI:  Regular diet   Bowel regimen Miralax & Senna     ENDO:   Goal euglycemia (-180)    HEME/ONC:  VTE prophylaxis: [x] SCDs  [x] hold chemoprophylaxis due to b/l frontal contusion     ID:  afebrile  covid neg 11/28     plan d/w Dr. Russ   Spectra [id number]     ASSESSMENT: 37M on ASA 81 (last taken 2 days ago, stopped plavix 3 weeks ago), hx of HTN, HLD, cardiac stents x2 (7/18/2020, family hx) s/p fall Friday night in the setting of alcohol intoxication w/o LOC. CT shows bifrontal hemorrhage contusion (L>R)    NEURO:  Neurochecks q4H   CT head interval 4H stable. Repeat CT Head this AM- follow up read stable.   Keppra 500 BID seizure ppx   pain control prn     PULM:  Incentive spirometry, Mobilize as tolerated  sat >98% on RA     CV:  SBP goal  mmHg  hx of cardiac stents, continue holding ASA   continue home metoprolol ER 25 qd   continue home lipitor 80 mg     RENAL:  IVL, voiding   BUN/Cr stable     GI:  Regular diet   Bowel regimen Miralax & Senna     ENDO:   Goal euglycemia (-180)    HEME/ONC:  VTE prophylaxis: [x] SCDs  [x] hold chemoprophylaxis due to b/l frontal contusion     ID:  afebrile  covid neg 11/28     plan d/w Dr. Russ   Spectra [id number]

## 2022-11-29 NOTE — DISCHARGE NOTE NURSING/CASE MANAGEMENT/SOCIAL WORK - NSDCPEFALRISK_GEN_ALL_CORE
For information on Fall & Injury Prevention, visit: [email or web address] OR  [email or web address] OR  https://www.cdc.gov/steadi/patient.html

## 2022-11-29 NOTE — DISCHARGE NOTE PROVIDER - HOSPITAL COURSE
37M on ASA 81 (last taken 2 days ago, stopped plavix 3 weeks ago), hx of HTN, HLD, cardiac stents x2 (7/18/2020, family hx) s/p fall Friday night in the setting of alcohol intoxication.  States he hit his head but did not suffer loss of consciousness as far as he knows; does not remember what happened. Vomited multiple times yesterday. Initial CT showing bifrontal hemorrhage contusion (L>R). Interval CT Head after 4 Hours showing Similar-appearing bilateral inferior frontal hemorrhagic parenchymal contusions. The presence of acute extra-axial hemorrhage in this region is not excluded. Patient was started on Keppra 500 mg BID for seizure prophylaxis. Patients home Aspirin has been held due to concern for hemorrhagic contusion.     Repeat CT Head this AM 11/29 showing........       While admitted, patient was evaluated by Physical Therapy with recommendations of......   On the day of discharge the patient is stable and has been medically cleared for discharge by Neurosurgeon. Instructed to follow up with Neurosurgeon Dr. Christopher Russ in 1 week 37M on ASA 81 (last taken 2 days ago, stopped plavix 3 weeks ago), hx of HTN, HLD, cardiac stents x2 (7/18/2020, family hx) s/p fall Friday night in the setting of alcohol intoxication.  States he hit his head but did not suffer loss of consciousness as far as he knows; does not remember what happened. Vomited multiple times yesterday. Initial CT showing bifrontal hemorrhage contusion (L>R). Interval CT Head after 4 Hours showing Similar-appearing bilateral inferior frontal hemorrhagic parenchymal contusions. The presence of acute extra-axial hemorrhage in this region is not excluded. Patient was started on Keppra 500 mg BID for seizure prophylaxis. Patients home Aspirin has been held due to concern for hemorrhagic contusion.     Repeat CT Head this AM 11/29 showing........       While admitted  On the day of discharge  is medically and neurologically stable for discharge to home. Instructed to follow up with Neurosurgeon Dr. Christopher Russ in 1 week 37M on ASA 81 (last taken 2 days ago, stopped plavix 3 weeks ago), hx of HTN, HLD, cardiac stents x2 (7/18/2020, family hx) s/p fall Friday night in the setting of alcohol intoxication.  States he hit his head but did not suffer loss of consciousness as far as he knows; does not remember what happened. Vomited multiple times yesterday. Initial CT showing bifrontal hemorrhage contusion (L>R). Interval CT Head after 4 Hours showing Similar-appearing bilateral inferior frontal hemorrhagic parenchymal contusions. The presence of acute extra-axial hemorrhage in this region is not excluded. Patient was started on Keppra 500 mg BID for seizure prophylaxis. Patients home Aspirin has been held due to concern for hemorrhagic contusion.   Repeat CT Head this AM 11/29 showing Similar appearing bilateral inferomedial frontal hemorrhagic parenchymal contusions.  Patient reportedly ambulating in ED without difficulties. On the day of discharge  is medically and neurologically stable for discharge to home. Instructed to follow up with Neurosurgeon Dr. Christopher Russ in 1 week 37M on ASA 81 (last taken 2 days ago, stopped plavix 3 weeks ago), hx of HTN, HLD, cardiac stents x2 (7/18/2020, family hx) s/p fall Friday night in the setting of alcohol intoxication.  States he hit his head but did not suffer loss of consciousness as far as he knows; does not remember what happened. Vomited multiple times yesterday. Initial CT showing bifrontal hemorrhage contusion (L>R). Interval CT Head after 4 Hours showing Similar-appearing bilateral inferior frontal hemorrhagic parenchymal contusions. The presence of acute extra-axial hemorrhage in this region is not excluded. Patient was started on Keppra 500 mg BID for seizure prophylaxis. Patients home Aspirin has been held due to concern for hemorrhagic contusion. Repeat CT Head this AM 11/29 showing Similar appearing bilateral inferomedial frontal hemorrhagic parenchymal contusions.  Patient reportedly ambulating in ED without difficulties. On the day of discharge  is medically and neurologically stable for discharge to home. Instructed to follow up with Neurosurgeon Dr. Christopher Russ in 1 week.

## 2022-11-29 NOTE — DISCHARGE NOTE PROVIDER - CARE PROVIDER_API CALL
Christopher Russ)  Neurosurgery  [address], [address]  [place] [address]  Phone: [phone]  Fax: [phone]  Follow Up Time:

## 2022-11-29 NOTE — DISCHARGE NOTE PROVIDER - CARE PROVIDERS DIRECT ADDRESSES
[email or web address]

## 2022-12-03 DIAGNOSIS — S06.9XAA UNSPECIFIED INTRACRANIAL INJURY WITH LOSS OF CONSCIOUSNESS STATUS UNKNOWN, INITIAL ENCOUNTER: ICD-10-CM

## 2022-12-12 ENCOUNTER — APPOINTMENT (OUTPATIENT)
Dept: NEUROSURGERY | Facility: CLINIC | Age: 37
End: 2022-12-12

## 2022-12-12 ENCOUNTER — NON-APPOINTMENT (OUTPATIENT)
Age: 37
End: 2022-12-12

## 2022-12-12 VITALS
BODY MASS INDEX: 22.44 KG/M2 | DIASTOLIC BLOOD PRESSURE: 93 MMHG | HEIGHT: 67 IN | HEART RATE: 74 BPM | WEIGHT: 143 LBS | OXYGEN SATURATION: 98 % | SYSTOLIC BLOOD PRESSURE: 132 MMHG

## 2022-12-12 DIAGNOSIS — I10 ESSENTIAL (PRIMARY) HYPERTENSION: ICD-10-CM

## 2022-12-12 DIAGNOSIS — E78.5 HYPERLIPIDEMIA, UNSPECIFIED: ICD-10-CM

## 2022-12-12 PROCEDURE — 99213 OFFICE O/P EST LOW 20 MIN: CPT

## 2022-12-12 RX ORDER — METOPROLOL SUCCINATE 25 MG/1
25 TABLET, EXTENDED RELEASE ORAL
Refills: 0 | Status: ACTIVE | COMMUNITY

## 2022-12-12 RX ORDER — LEVETIRACETAM 500 MG/1
500 TABLET, FILM COATED ORAL
Refills: 0 | Status: DISCONTINUED | COMMUNITY

## 2022-12-12 RX ORDER — ATORVASTATIN CALCIUM 80 MG/1
80 TABLET, FILM COATED ORAL
Refills: 0 | Status: ACTIVE | COMMUNITY

## 2022-12-14 ENCOUNTER — APPOINTMENT (OUTPATIENT)
Dept: CT IMAGING | Facility: IMAGING CENTER | Age: 37
End: 2022-12-14

## 2022-12-14 ENCOUNTER — OUTPATIENT (OUTPATIENT)
Dept: OUTPATIENT SERVICES | Facility: HOSPITAL | Age: 37
LOS: 1 days | End: 2022-12-14
Payer: MEDICAID

## 2022-12-14 DIAGNOSIS — S06.9XAA UNSPECIFIED INTRACRANIAL INJURY WITH LOSS OF CONSCIOUSNESS STATUS UNKNOWN, INITIAL ENCOUNTER: ICD-10-CM

## 2022-12-14 DIAGNOSIS — Z00.8 ENCOUNTER FOR OTHER GENERAL EXAMINATION: ICD-10-CM

## 2022-12-14 PROCEDURE — 70450 CT HEAD/BRAIN W/O DYE: CPT | Mod: 26

## 2022-12-14 PROCEDURE — 70450 CT HEAD/BRAIN W/O DYE: CPT

## 2022-12-19 ENCOUNTER — APPOINTMENT (OUTPATIENT)
Dept: NEUROSURGERY | Facility: CLINIC | Age: 37
End: 2022-12-19

## 2022-12-19 VITALS
HEIGHT: 67 IN | DIASTOLIC BLOOD PRESSURE: 74 MMHG | WEIGHT: 143 LBS | OXYGEN SATURATION: 98 % | BODY MASS INDEX: 22.44 KG/M2 | SYSTOLIC BLOOD PRESSURE: 126 MMHG | HEART RATE: 69 BPM

## 2022-12-19 PROCEDURE — 99214 OFFICE O/P EST MOD 30 MIN: CPT

## 2022-12-19 RX ORDER — ASPIRIN ENTERIC COATED TABLETS 81 MG 81 MG/1
81 TABLET, DELAYED RELEASE ORAL DAILY
Refills: 0 | Status: DISCONTINUED | COMMUNITY
Start: 2022-05-11 | End: 2022-12-19

## 2022-12-19 NOTE — ASSESSMENT
[FreeTextEntry1] : IMPRESSION:\par \par 37 M on ASA 81 (last taken 2 days ago, stopped Plavix 3 weeks ago), hx of HTN, HLD, cardiac stents x2 (7/18/2020, family hx) s/p fall on 11/26/2022 night with no LOC in the setting of alcohol intoxication. Vomited multiple times after the accident. Initial CT showing bifrontal hemorrhage contusion (L>R). Interval. CT Head after 4 Hours showing Similar-appearing bilateral inferior frontal hemorrhagic parenchymal contusions. Today pt with improved symptoms, has a very mild headache and dizziness at the end of a full shift.  Pt neurologically intact. \par \par PLAN:\par \par Continue watching symptoms \par May resume AC at this time, Cleared for ASA.\par Pt to follow up with cardiology for further  AC management\par F/U in a month \par \par \par

## 2022-12-19 NOTE — RESULTS/DATA
[FreeTextEntry1] : EXAM: [id number] - CT BRAIN - ORDERED BY: DANNY NEVILLE\par \par \par PROCEDURE DATE: 12/14/2022\par \par \par \par INTERPRETATION: CLINICAL INDICATION: Traumatic brain injury, follow-up\par \par 5mm axial sections of the brain were obtained from base to vertex, without the intravenous administration of contrast material. Coronal and sagittal computer generated reconstructed views are available.\par \par Comparison is made with the prior CT of 11/29/2022.\par \par Lucency in the left inferior frontal lobe is identified consistent with residua from the patient's prior hemorrhagic contusion. The hemorrhage has resolved. Hemorrhage in the inferior right frontal lobe has also resolved. Ventricles and sulci are normal for the patient's age. There is no acute hemorrhage. There are no depressed skull fractures.\par \par \par IMPRESSION: Left inferior frontal lucency consistent with residua from resolving left inferior frontal hemorrhagic contusion compared with 11/29/2022. Resolved hemorrhage right inferior frontal lobe.\par

## 2022-12-19 NOTE — HISTORY OF PRESENT ILLNESS
[FreeTextEntry1] : 37 M on ASA 81 (last taken 2 days ago, stopped plavix 3 weeks ago), hx of HTN, HLD, cardiac stents x2 (7/18/2020, family hx) s/p fall on 11/26/2022 night in the setting of alcohol intoxication. States he hit his head but did not suffer oss of consciousness . Vomited multiple times after the accident. Initial CT showing bifrontal hemorrhage contusion (L>R). Interval. CT Head after 4 Hours showing Similar-appearing bilateral inferior frontal hemorrhagic parenchymal contusions. The presence of acute extra-axial hemorrhage in this region is not excluded. Patient was started on Keppra 500 mg BID for seizure prophylaxis.\par \par Today Mr. Marquez present for follow up with an other scan. He has improved symptoms. Denies headache, speech impairment, vision problems or seizures. However he still has very mild occasional dizziness and mild headache after working a whole day.  He did follow up with cardiology and was told to wait for further advised from this office regarding clearance for AC.

## 2022-12-19 NOTE — PHYSICAL EXAM
[General Appearance - Alert] : alert [General Appearance - In No Acute Distress] : in no acute distress [General Appearance - Well Nourished] : well nourished [General Appearance - Well-Appearing] : healthy appearing [Oriented To Time, Place, And Person] : oriented to person, place, and time [Impaired Insight] : insight and judgment were intact [Affect] : the affect was normal [Memory Recent] : recent memory was not impaired [Person] : oriented to person [Place] : oriented to place [Time] : oriented to time [Short Term Intact] : short term memory intact [Cranial Nerves Optic (II)] : visual acuity intact bilaterally,  pupils equal round and reactive to light [Cranial Nerves Oculomotor (III)] : extraocular motion intact [Cranial Nerves Trigeminal (V)] : facial sensation intact symmetrically [Cranial Nerves Facial (VII)] : face symmetrical [Cranial Nerves Vestibulocochlear (VIII)] : hearing was intact bilaterally [Cranial Nerves Accessory (XI - Cranial And Spinal)] : head turning and shoulder shrug symmetric [Cranial Nerves Hypoglossal (XII)] : there was no tongue deviation with protrusion [Motor Tone] : muscle tone was normal in all four extremities [Motor Strength] : muscle strength was normal in all four extremities [Involuntary Movements] : no involuntary movements were seen [Sensation Tactile Decrease] : light touch was intact [Sensation Pain / Temperature Decrease] : pain and temperature was intact [Sclera] : the sclera and conjunctiva were normal [PERRL With Normal Accommodation] : pupils were equal in size, round, reactive to light, with normal accommodation [Outer Ear] : the ears and nose were normal in appearance [Both Tympanic Membranes Were Examined] : both tympanic membranes were normal [Neck Appearance] : the appearance of the neck was normal [] : no respiratory distress [Respiration, Rhythm And Depth] : normal respiratory rhythm and effort [Apical Impulse] : the apical impulse was normal [Arterial Pulses Carotid] : carotid pulses were normal with no bruits [Abdominal Aorta] : the abdominal aorta was normal [No CVA Tenderness] : no ~M costovertebral angle tenderness [No Spinal Tenderness] : no spinal tenderness [Abnormal Walk] : normal gait [Skin Color & Pigmentation] : normal skin color and pigmentation [FreeTextEntry8] : no drift, mild imbalance at tandem

## 2022-12-25 NOTE — HISTORY OF PRESENT ILLNESS
[FreeTextEntry1] : 37 M on ASA 81 (last taken 2 days ago, stopped plavix 3 weeks ago), hx of HTN, HLD, cardiac stents x2 (7/18/2020, family hx) s/p fall on 11/26/2022  night in the setting of alcohol intoxication.  States he hit his head but did not suffer oss of consciousness . Vomited multiple times after the accident.  Initial CT showing bifrontal hemorrhage contusion (L>R). Interval.  CT Head after 4 Hours showing Similar-appearing bilateral inferior frontal hemorrhagic parenchymal contusions. The presence of acute extra-axial hemorrhage in this region is not excluded. Patient was started on Keppra 500 mg BID for seizure prophylaxis.\par \par Today Mr. Marquez present ambulatory with improved symptoms. Denies headache, speech impairment, vision problems or seizures. He still has very mild occasional headache. He completed the Keppra. He went back to work with no issues with little fatigue and tired. \par \par PCSS Concussion score: 6\par 6:none\par 5:none\par 4:none\par 3:none\par 2:none\par 1:Headache, emotional, irritability, trouble falling asleep, dizziness, difficulty remembering\par \par Level activity- 8\par \par NW Concussion Score- 4\par A lot -\par A little-headache, neck pain, dizziness, hard to fall asleep\par None--

## 2022-12-25 NOTE — RESULTS/DATA
[FreeTextEntry1] : ACC: [id number] EXAM: CT BRAIN\par \par PROCEDURE DATE: 11/29/2022\par \par \par \par INTERPRETATION: Noncontrast CT of the brain.\par \par CLINICAL INDICATION: Follow-up bilateral frontal contusions\par \par TECHNIQUE : Axial CT scanning of the brain was obtained from the skull base to the vertex without the administration of intravenous contrast. Sagittal and coronal reformats were provided.\par \par COMPARISON: CT brain 11/28/2022\par \par FINDINGS:\par \par Similar appearing bilateral inferomedial frontal hemorrhagic parenchymal contusions.\par \par No hydrocephalus. Similar minimal rightward midline shift. No effacement of basal cisterns.\par \par No displaced calvarial fracture.\par \par The visualized paranasal sinuses and mastoid air cells are clear.\par \par IMPRESSION:\par \par Similar appearing bilateral inferomedial frontal hemorrhagic parenchymal contusions.\par

## 2022-12-25 NOTE — PHYSICAL EXAM
[General Appearance - Alert] : alert [General Appearance - In No Acute Distress] : in no acute distress [General Appearance - Well Nourished] : well nourished [General Appearance - Well-Appearing] : healthy appearing [Person] : oriented to person [Place] : oriented to place [Time] : oriented to time [Short Term Intact] : short term memory intact [Cranial Nerves Optic (II)] : visual acuity intact bilaterally,  pupils equal round and reactive to light [Cranial Nerves Oculomotor (III)] : extraocular motion intact [Cranial Nerves Trigeminal (V)] : facial sensation intact symmetrically [Cranial Nerves Facial (VII)] : face symmetrical [Cranial Nerves Vestibulocochlear (VIII)] : hearing was intact bilaterally [Cranial Nerves Accessory (XI - Cranial And Spinal)] : head turning and shoulder shrug symmetric [Cranial Nerves Hypoglossal (XII)] : there was no tongue deviation with protrusion [Motor Tone] : muscle tone was normal in all four extremities [Motor Strength] : muscle strength was normal in all four extremities [Involuntary Movements] : no involuntary movements were seen [Sensation Tactile Decrease] : light touch was intact [Sensation Pain / Temperature Decrease] : pain and temperature was intact [Balance] : balance was intact [Sclera] : the sclera and conjunctiva were normal [PERRL With Normal Accommodation] : pupils were equal in size, round, reactive to light, with normal accommodation [Outer Ear] : the ears and nose were normal in appearance [Both Tympanic Membranes Were Examined] : both tympanic membranes were normal [Neck Appearance] : the appearance of the neck was normal [] : no respiratory distress [Respiration, Rhythm And Depth] : normal respiratory rhythm and effort [Apical Impulse] : the apical impulse was normal [Heart Rate And Rhythm] : heart rate was normal and rhythm regular [Arterial Pulses Carotid] : carotid pulses were normal with no bruits [Bowel Sounds] : normal bowel sounds [Abdomen Soft] : soft [No CVA Tenderness] : no ~M costovertebral angle tenderness [No Spinal Tenderness] : no spinal tenderness [Abnormal Walk] : normal gait [Skin Color & Pigmentation] : normal skin color and pigmentation [Skin Turgor] : normal skin turgor [FreeTextEntry8] : no drift, no imbalance at tandem

## 2022-12-25 NOTE — ASSESSMENT
[FreeTextEntry1] : 37 M on ASA 81 (last taken 2 days ago, stopped plavix 3 weeks ago), hx of HTN, HLD, cardiac stents x2 (7/18/2020, family hx) s/p fall on 11/26/2022 night with no LOC in the setting of alcohol intoxication.  Vomited multiple times after the accident. Initial CT showing bifrontal hemorrhage contusion (L>R). Interval. CT Head after 4 Hours showing Similar-appearing bilateral inferior frontal hemorrhagic parenchymal contusions. Patient was started on Keppra 500 mg BID for seizure prophylaxis. Today pt with  improved symptoms, has a very mild headache at 1/10. Pt neurologically intact.Concussion scores are 6/4.   He went back to work with no issues.\par \par PLAN:\par \par Continue watching symptoms \par ASA to be held until the F/U \par CT head no contrast in 1 week.\par F/U one week 12/19/2022 for antiplatelet clearance

## 2023-01-26 ENCOUNTER — APPOINTMENT (OUTPATIENT)
Dept: NEUROSURGERY | Facility: CLINIC | Age: 38
End: 2023-01-26
Payer: MEDICAID

## 2023-01-26 VITALS
WEIGHT: 143 LBS | SYSTOLIC BLOOD PRESSURE: 126 MMHG | DIASTOLIC BLOOD PRESSURE: 88 MMHG | HEIGHT: 67 IN | BODY MASS INDEX: 22.44 KG/M2 | HEART RATE: 70 BPM | OXYGEN SATURATION: 99 %

## 2023-01-26 PROCEDURE — 99214 OFFICE O/P EST MOD 30 MIN: CPT

## 2023-01-26 NOTE — PHYSICAL EXAM
[General Appearance - Alert] : alert [General Appearance - In No Acute Distress] : in no acute distress [General Appearance - Well Nourished] : well nourished [General Appearance - Well-Appearing] : healthy appearing [Oriented To Time, Place, And Person] : oriented to person, place, and time [Impaired Insight] : insight and judgment were intact [Affect] : the affect was normal [Memory Recent] : recent memory was not impaired [Person] : oriented to person [Place] : oriented to place [Time] : oriented to time [Short Term Intact] : short term memory intact [Cranial Nerves Optic (II)] : visual acuity intact bilaterally,  pupils equal round and reactive to light [Cranial Nerves Oculomotor (III)] : extraocular motion intact [Cranial Nerves Trigeminal (V)] : facial sensation intact symmetrically [Cranial Nerves Facial (VII)] : face symmetrical [Cranial Nerves Vestibulocochlear (VIII)] : hearing was intact bilaterally [Cranial Nerves Accessory (XI - Cranial And Spinal)] : head turning and shoulder shrug symmetric [Cranial Nerves Hypoglossal (XII)] : there was no tongue deviation with protrusion [Motor Tone] : muscle tone was normal in all four extremities [Motor Strength] : muscle strength was normal in all four extremities [Sensation Tactile Decrease] : light touch was intact [Sensation Pain / Temperature Decrease] : pain and temperature was intact [Balance] : balance was intact [Sclera] : the sclera and conjunctiva were normal [PERRL With Normal Accommodation] : pupils were equal in size, round, reactive to light, with normal accommodation [Outer Ear] : the ears and nose were normal in appearance [Both Tympanic Membranes Were Examined] : both tympanic membranes were normal [Neck Appearance] : the appearance of the neck was normal [] : no respiratory distress [Respiration, Rhythm And Depth] : normal respiratory rhythm and effort [Apical Impulse] : the apical impulse was normal [Heart Rate And Rhythm] : heart rate was normal and rhythm regular [Arterial Pulses Carotid] : carotid pulses were normal with no bruits [Abdominal Aorta] : the abdominal aorta was normal [No CVA Tenderness] : no ~M costovertebral angle tenderness [No Spinal Tenderness] : no spinal tenderness [Abnormal Walk] : normal gait [Involuntary Movements] : no involuntary movements were seen [Skin Color & Pigmentation] : normal skin color and pigmentation [FreeTextEntry8] : no drift, mild to moderate instability at tandem.

## 2023-01-26 NOTE — ASSESSMENT
[FreeTextEntry1] : IMPRESSION:\par \par 37 M on ASA 81 (last taken 2 days ago, stopped Plavix 3 weeks ago), hx of HTN, HLD, cardiac stents x2 (7/18/2020, family hx) s/p fall on 11/26/2022 night with no LOC in the setting of alcohol intoxication. Vomited multiple times after the accident. Initial CT showing bifrontal hemorrhage contusion (L>R). F/u CT head revealed resolution of SDH.  Cleared for ASA, had follow up with cardiology. Now C/O continued dizziness and found to have mild instability at tandem. \par \par \par PLAN:\par \par Continue watching symptoms \par STARS VT for dizziness and balance for strengthening and modalities , 2-3 times/week x 8 weeks. \par F/U in a 6 weeks for re evaluation of symptoms.\par \par

## 2023-01-26 NOTE — HISTORY OF PRESENT ILLNESS
[FreeTextEntry1] : 37 M on ASA 81 (last taken 2 days ago, stopped Plavix 3 weeks ago), hx of HTN, HLD, cardiac stents x2 (7/18/2020, family hx) s/p fall on 11/26/2022 night in the setting of alcohol intoxication. CT head in the hospital showed bifrontal hemorrhage contusion (L>R). Interval.  Mr. Marquez f/u with  for follow up with an other scan. He has improved symptoms,  he still has very mild occasional dizziness and mild headache after working a whole day. .\par \par Pt returned after a month for evaluation of symptoms.  He did follow up with cardiology and restarted ASA. Pt working whole day, doing well with no issue. However pt has momentary head ache, dizziness often when change in position. Pt also c/o no taste sense which has happened since the trauma. Pt here to  re evaluate his symptoms.

## 2023-03-13 ENCOUNTER — APPOINTMENT (OUTPATIENT)
Dept: NEUROSURGERY | Facility: CLINIC | Age: 38
End: 2023-03-13
Payer: MEDICAID

## 2023-03-13 VITALS
HEART RATE: 70 BPM | BODY MASS INDEX: 22.44 KG/M2 | DIASTOLIC BLOOD PRESSURE: 86 MMHG | SYSTOLIC BLOOD PRESSURE: 125 MMHG | HEIGHT: 67 IN | OXYGEN SATURATION: 99 % | WEIGHT: 143 LBS

## 2023-03-13 PROCEDURE — 99213 OFFICE O/P EST LOW 20 MIN: CPT

## 2023-03-22 NOTE — HISTORY OF PRESENT ILLNESS
[FreeTextEntry1] : 37 M on ASA 81 (last taken 2 days ago, stopped Plavix 3 weeks ago), hx of HTN, HLD, cardiac stents x2 (7/18/2020, family hx) s/p fall on 11/26/2022 night in the setting of alcohol intoxication. CT head in the hospital showed bifrontal hemorrhage contusion (L>R). Interval. Mr. Marquez f/u with for follow up with an other scan. He has improved symptoms, he still has very mild occasional dizziness and mild headache after working a whole day. .\par Pt returned with improved symptoms. Denies headache, dizziness or vision/hearing issues. Doing his work well. He is on ASA with cardiology whom he had recent follow up. He had follow up with ENT who did crystal removal maneuver which helped his dizziness.  He is ok working whole day, doing well with all other symptoms.

## 2023-03-22 NOTE — ASSESSMENT
[FreeTextEntry1] : IMPRESSION:\par \par 37 M on ASA 82 with  hx of HTN, HLD, cardiac stents x 2 (7/18/2020, family hx) s/p fall on 11/26/2022 night with no LOC in the setting of alcohol intoxication. Vomited multiple times after the accident. Initial CT showing bifrontal hemorrhage contusion (L>R).Pt has h/o Plavix use until few weeks before the trauma.  F/u CT head in the clinic in Dec 2022 revealed resolution of SDH and cleared for ASA and he is on. Pt had C/O continued dizziness and now resolved after ENT  did the Epley maneuver. \par \par \par PLAN: \par \par Continue watching symptoms \par F/U with cardiology for AC management\par F/U with ENT for dizzy spells if happens again.\par Precautions given\par F/U as needed. \par

## 2023-03-22 NOTE — PHYSICAL EXAM
[General Appearance - Alert] : alert [General Appearance - In No Acute Distress] : in no acute distress [General Appearance - Well Nourished] : well nourished [General Appearance - Well-Appearing] : healthy appearing [Oriented To Time, Place, And Person] : oriented to person, place, and time [Impaired Insight] : insight and judgment were intact [Affect] : the affect was normal [Memory Recent] : recent memory was not impaired [Person] : oriented to person [Place] : oriented to place [Time] : oriented to time [Short Term Intact] : short term memory intact [Cranial Nerves Optic (II)] : visual acuity intact bilaterally,  pupils equal round and reactive to light [Cranial Nerves Oculomotor (III)] : extraocular motion intact [Cranial Nerves Trigeminal (V)] : facial sensation intact symmetrically [Cranial Nerves Facial (VII)] : face symmetrical [Cranial Nerves Vestibulocochlear (VIII)] : hearing was intact bilaterally [Cranial Nerves Accessory (XI - Cranial And Spinal)] : head turning and shoulder shrug symmetric [Cranial Nerves Hypoglossal (XII)] : there was no tongue deviation with protrusion [Motor Tone] : muscle tone was normal in all four extremities [Motor Strength] : muscle strength was normal in all four extremities [Sensation Tactile Decrease] : light touch was intact [Sensation Pain / Temperature Decrease] : pain and temperature was intact [Balance] : balance was intact [Sclera] : the sclera and conjunctiva were normal [PERRL With Normal Accommodation] : pupils were equal in size, round, reactive to light, with normal accommodation [Outer Ear] : the ears and nose were normal in appearance [Both Tympanic Membranes Were Examined] : both tympanic membranes were normal [Neck Appearance] : the appearance of the neck was normal [] : no respiratory distress [Respiration, Rhythm And Depth] : normal respiratory rhythm and effort [Apical Impulse] : the apical impulse was normal [Heart Rate And Rhythm] : heart rate was normal and rhythm regular [Arterial Pulses Carotid] : carotid pulses were normal with no bruits [Abdominal Aorta] : the abdominal aorta was normal [No CVA Tenderness] : no ~M costovertebral angle tenderness [No Spinal Tenderness] : no spinal tenderness [Abnormal Walk] : normal gait [Involuntary Movements] : no involuntary movements were seen [Skin Color & Pigmentation] : normal skin color and pigmentation [FreeTextEntry8] : no drift, no imbalance in tandem

## 2024-03-28 NOTE — DISCHARGE NOTE PROVIDER - NSDCQMCOGNITION_NEU_ALL_CORE
No difficulties Detail Level: Zone Patient Specific Otc Recommendations (Will Not Stick From Patient To Patient): Mee villarreal wash

## 2024-09-17 ENCOUNTER — EMERGENCY (EMERGENCY)
Facility: HOSPITAL | Age: 39
LOS: 1 days | Discharge: HOME | End: 2024-09-17
Attending: EMERGENCY MEDICINE
Payer: COMMERCIAL

## 2024-09-17 VITALS
TEMPERATURE: 99 F | OXYGEN SATURATION: 100 % | RESPIRATION RATE: 16 BRPM | WEIGHT: 139.99 LBS | HEART RATE: 85 BPM | SYSTOLIC BLOOD PRESSURE: 134 MMHG | DIASTOLIC BLOOD PRESSURE: 92 MMHG | HEIGHT: 66 IN

## 2024-09-17 PROCEDURE — 73630 X-RAY EXAM OF FOOT: CPT

## 2024-09-17 PROCEDURE — 28430 CLTX TALUS FRACTURE W/O MNPJ: CPT | Mod: 54,LT

## 2024-09-17 PROCEDURE — 73630 X-RAY EXAM OF FOOT: CPT | Mod: 26,LT

## 2024-09-17 PROCEDURE — 73590 X-RAY EXAM OF LOWER LEG: CPT

## 2024-09-17 PROCEDURE — 29515 APPLICATION SHORT LEG SPLINT: CPT | Mod: LT

## 2024-09-17 PROCEDURE — 73610 X-RAY EXAM OF ANKLE: CPT

## 2024-09-17 PROCEDURE — 99284 EMERGENCY DEPT VISIT MOD MDM: CPT | Mod: 25

## 2024-09-17 PROCEDURE — 73610 X-RAY EXAM OF ANKLE: CPT | Mod: 26,LT

## 2024-09-17 PROCEDURE — 99284 EMERGENCY DEPT VISIT MOD MDM: CPT | Mod: 57

## 2024-09-17 PROCEDURE — 73590 X-RAY EXAM OF LOWER LEG: CPT | Mod: 26,LT

## 2024-09-18 VITALS
HEART RATE: 81 BPM | TEMPERATURE: 98 F | RESPIRATION RATE: 17 BRPM | OXYGEN SATURATION: 99 % | SYSTOLIC BLOOD PRESSURE: 126 MMHG | DIASTOLIC BLOOD PRESSURE: 82 MMHG

## 2024-09-18 NOTE — ED PROVIDER NOTE - ATTENDING APP SHARED VISIT CONTRIBUTION OF CARE
I have reviewed this note, the presenting symptoms, and the Chief Complaint and the History of Present Illness as documented, with the other care provider(s) including resident, ACP, and nurses on the patient care team. I have also reviewed this patient's past medical/surgical history and social/family history as reviewed and listed in this electronic medical record.  I agree with the resident/ACP documentation except where noted otherwise in my personal documentation.  See MDM.  --Mike Diana MD, Attending Physician

## 2024-09-18 NOTE — ED PROVIDER NOTE - PATIENT PORTAL LINK FT
You can access the FollowMyHealth Patient Portal offered by [facility] by registering at the following website: [email or web address]. By joining [employer]’s FollowMyHealth portal, you will also be able to view your health information using other applications (apps) compatible with our system.

## 2024-09-18 NOTE — ED PROVIDER NOTE - PHYSICAL EXAMINATION
CONSTITUTIONAL: Patient is awake, alert and oriented x 3. Patient is well appearing and in no acute distress  HEAD: NCAT  NECK: supple, FROM  LUNGS: CTA b/l, no wheezing or rales   HEART: RRR.+S1S2 no murmurs  EXTREMITY: FROM upper and lower ext b/l. There is diffuse swelling of the left ankle and foot with ecchymotic changes inferior to the bilateral malleoli.  Positive tender palpation of the anterior and inferior aspects of the medial and lateral malleoli, no posterior malleoli tenderness palpation bilaterally.  No tenderness palpation of the 1st through 5th metatarsals or posterior shin. 2+ dp pulse left foot with normal gross sensation   SKIN: No rash or lesions  NEURO: No focal deficits

## 2024-09-18 NOTE — ED PROVIDER NOTE - CLINICAL SUMMARY MEDICAL DECISION MAKING FREE TEXT BOX
L ankle inversion injury playing soccer.  Lateral mal/posterior mal tenderness to palpation.  Achilles intact.  NVI.  WWP.  Soft compartments.  No prox fib tender to palpation.  No gross deformities but some swelling to L ankle.  No other injuries/pain/complaints.  Likely msk strain/sprain v fx.  Needs XR, pain Rx, splint, prob refer to ortho/podiatry.  --Mike Diana MD, Attending Physician

## 2024-09-18 NOTE — ED ADULT NURSE NOTE - NSFALLHARMRISKINTERV_ED_ALL_ED

## 2024-09-18 NOTE — ED PROVIDER NOTE - PROGRESS NOTE DETAILS
Pt imaging with questioned avulsion of the posterior talus and distal fibula for which patient was placed in posterior splint. Prelim radiology reading no acute fracture. pt made aware of ED read and recommendation for podiatry follow up. Will d/c home with crutches as well  Scarlet Page PA-C

## 2024-09-18 NOTE — ED PROVIDER NOTE - OBJECTIVE STATEMENT
38-year-old male presents to the ED complaining of left ankle pain.  Patient reports that on the evening of 9/15 he was playing soccer running and did not realize there was a divot in the ground which he rolled his ankle on.  He reports that he immediately had pain and was assisted into the car.  Since has bared weight but with some difficulty.  Came to the ED because of increased swelling and bruising to the area.  He denies numbness, tingling, other MSK pain/injuries.

## 2024-09-18 NOTE — ED POST DISCHARGE NOTE - RESULT SUMMARY
radiology discrepancy final read of L ankle xray: lateral malleolus avulsive injury and questionable avulsion fracture at talus.

## 2024-09-18 NOTE — ED ADULT NURSE NOTE - OBJECTIVE STATEMENT
The patient is a 38y male presenting to the ED from home for complaints of a fall. Patient presents with a PMH of CAD, HLD, 2Stents on Aspirin endorsing 2 days ago he was playing soccer and fell into a hole he was unaware of, causing him to roll on his ankle. Patient endorsed immediate pain and was helped into the car. Today he presented with increased pain and edema to that Left ankle prompting visit to the ED. Upon assessment Pt denies chest pain, palpitations, shortness of breath, headache, visual disturbances, numbness/tingling, fever, chills, diaphoresis,  nausea, vomiting, constipation, diarrhea, or urinary symptoms.  Safety and comfort measures provided, bed locked and in lowest position, side rails up for safety.

## 2024-09-18 NOTE — ED POST DISCHARGE NOTE - DETAILS
9/18/24 Dell AGUAYO- final read consistent with ED impression so patient was placed in splint and told to f/up with podiatry. I called patient and let him know the final read from radiologist as well and encouraged follow up.

## 2024-09-18 NOTE — ED PROVIDER NOTE - NSFOLLOWUPINSTRUCTIONS_ED_ALL_ED_FT
-Rest and elevate your left ankle when possible. Apply cold pack for 20 minutes multiple times daily.  Keep splint clean, dry, and in place. Use crutches for ambulation as tolerated     -For continued or recurrent pain recommend taking over the counter Tylenol (acetaminophen) 1000 mg every 6 hours as needed and/or over the counter Motrin (Ibuprofen/Advil) 600mg every 6 hours as needed.     -Follow up with Podiatry after discharge for reevaluation and continued management. Please see office information below to call and schedule appointment:       [facility]    Foot and Ankle Surgery    [address] [place] [address], [address]    [place] [address]    Phone: [phone]    Fax: [phone]    -Return to ED for new or worsened symptoms including severe pain, swelling, numbness/tingling, bluish discoloration or any concern.

## 2024-09-18 NOTE — ED ADULT NURSE NOTE - NSFALLRISKFACTORS_ED_ALL_ED
Aspirin/Coagulation: Bleeding disorder either through use of anticoagulants or underlying clinical condition(s)

## 2024-09-26 ENCOUNTER — APPOINTMENT (OUTPATIENT)
Dept: PODIATRY | Facility: CLINIC | Age: 39
End: 2024-09-26
Payer: COMMERCIAL

## 2024-09-26 DIAGNOSIS — Z78.9 OTHER SPECIFIED HEALTH STATUS: ICD-10-CM

## 2024-09-26 DIAGNOSIS — S93.422A SPRAIN OF DELTOID LIGAMENT OF LEFT ANKLE, INITIAL ENCOUNTER: ICD-10-CM

## 2024-09-26 DIAGNOSIS — S82.63XA DISPLACED FRACTURE OF LATERAL MALLEOLUS OF UNSPECIFIED FIBULA, INITIAL ENCOUNTER FOR CLOSED FRACTURE: ICD-10-CM

## 2024-09-26 DIAGNOSIS — Z82.49 FAMILY HISTORY OF ISCHEMIC HEART DISEASE AND OTHER DISEASES OF THE CIRCULATORY SYSTEM: ICD-10-CM

## 2024-09-26 DIAGNOSIS — S82.831S OTHER FRACTURE OF UPPER AND LOWER END OF RIGHT FIBULA, SEQUELA: ICD-10-CM

## 2024-09-26 PROCEDURE — 27786 TREATMENT OF ANKLE FRACTURE: CPT | Mod: LT

## 2024-09-26 PROCEDURE — 99203 OFFICE O/P NEW LOW 30 MIN: CPT | Mod: 57

## 2024-09-26 PROCEDURE — 99213 OFFICE O/P EST LOW 20 MIN: CPT | Mod: 57

## 2024-09-27 PROBLEM — Z82.49 FAMILY HISTORY OF HYPERTENSION: Status: ACTIVE | Noted: 2024-09-27

## 2024-09-27 PROBLEM — Z78.9 CONSUMES ALCOHOL WEEKLY: Status: ACTIVE | Noted: 2024-09-27

## 2024-09-27 NOTE — HISTORY OF PRESENT ILLNESS
[FreeTextEntry1] : Patient presents today with his girlfriend after sustaining an injury to his left ankle, 2 weeks ago, while playing soccer with his kids.  Patient has difficulty ambulating due to pain and discomfort.  He was seen at [facility] and diagnosed with an avulsion fracture of the fibula, left with medial deltoid pain as well.

## 2024-09-27 NOTE — CONSULT LETTER
[Dear  ___] : Dear  [unfilled], [Courtesy Letter:] : I had the pleasure of seeing your patient, [unfilled], in my office today. [Please see my note below.] : Please see my note below. [Consult Closing:] : Thank you very much for allowing me to participate in the care of this patient.  If you have any questions, please do not hesitate to contact me. [Sincerely,] : Sincerely, [FreeTextEntry2] : Viviane Amado -[address] [place] [id number]  [FreeTextEntry3] : Charles M. Lombardi, DPM, FACFAS Systems Chief, Podiatric Services [facility] Assistant Professor of Surgery [employer] [facility] [employer] at [facility]

## 2024-09-27 NOTE — PHYSICAL EXAM
[Ankle Swelling (On Exam)] : present [Ankle Swelling On The Left] : moderate [2+] : right foot dorsalis pedis 2+ [Normal Foot/Ankle] : Both lower extremities were exposed and visualized. Standing exam demonstrates normal foot posture and alignment. Hindfoot exam shows no hindfoot valgus or varus [Skin Color & Pigmentation] : normal skin color and pigmentation [Skin Turgor] : normal skin turgor [No Focal Deficits] : no focal deficits [Sensation] : the sensory exam was normal to light touch and pinprick [Deep Tendon Reflexes (DTR)] : deep tendon reflexes were 2+ and symmetric [Motor Exam] : the motor exam was normal [General Appearance - Alert] : alert [Oriented To Time, Place, And Person] : oriented to person, place, and time [Varicose Veins Of Lower Extremities] : not present [] : not present [Delayed in the Right Toes] : capillary refills normal in right toes [Delayed in the Left Toes] : capillary refills normal in the left toes [de-identified] : Pain over the lateral collateral ligaments and medial collateral ligaments.   [Foot Ulcer] : no foot ulcer [Skin Induration] : no skin induration [FreeTextEntry1] : Ecchymosis and swelling to the left ankle.

## 2024-09-27 NOTE — CONSULT LETTER
[Dear  ___] : Dear  [unfilled], [Courtesy Letter:] : I had the pleasure of seeing your patient, [unfilled], in my office today. [Please see my note below.] : Please see my note below. [Consult Closing:] : Thank you very much for allowing me to participate in the care of this patient.  If you have any questions, please do not hesitate to contact me. [Sincerely,] : Sincerely, [FreeTextEntry2] : Viviane Amado -[address] [place] [id number]  [FreeTextEntry3] : Charles M. Lombardi, DPM, FACFAS Systems Chief, Podiatric Services [facility] Assistant Professor of Surgery [facility] [employer] at [facility]

## 2024-09-27 NOTE — PHYSICAL EXAM
[Ankle Swelling (On Exam)] : present [Ankle Swelling On The Left] : moderate [2+] : right foot dorsalis pedis 2+ [Normal Foot/Ankle] : Both lower extremities were exposed and visualized. Standing exam demonstrates normal foot posture and alignment. Hindfoot exam shows no hindfoot valgus or varus [Skin Color & Pigmentation] : normal skin color and pigmentation [Skin Turgor] : normal skin turgor [Sensation] : the sensory exam was normal to light touch and pinprick [No Focal Deficits] : no focal deficits [Deep Tendon Reflexes (DTR)] : deep tendon reflexes were 2+ and symmetric [Motor Exam] : the motor exam was normal [General Appearance - Alert] : alert [Oriented To Time, Place, And Person] : oriented to person, place, and time [Varicose Veins Of Lower Extremities] : not present [] : not present [Delayed in the Right Toes] : capillary refills normal in right toes [Delayed in the Left Toes] : capillary refills normal in the left toes [de-identified] : Pain over the lateral collateral ligaments and medial collateral ligaments.   [Foot Ulcer] : no foot ulcer [Skin Induration] : no skin induration [FreeTextEntry1] : Ecchymosis and swelling to the left ankle.

## 2024-09-27 NOTE — PROCEDURE
[FreeTextEntry1] : Independently reviewed x-rays taken. There is an avulsion at the tip of the fibula, consistent with an avulsion fracture.

## 2024-10-09 ENCOUNTER — APPOINTMENT (OUTPATIENT)
Dept: PODIATRY | Facility: CLINIC | Age: 39
End: 2024-10-09
Payer: COMMERCIAL

## 2024-10-09 DIAGNOSIS — S82.63XA DISPLACED FRACTURE OF LATERAL MALLEOLUS OF UNSPECIFIED FIBULA, INITIAL ENCOUNTER FOR CLOSED FRACTURE: ICD-10-CM

## 2024-10-09 DIAGNOSIS — S93.422A SPRAIN OF DELTOID LIGAMENT OF LEFT ANKLE, INITIAL ENCOUNTER: ICD-10-CM

## 2024-10-09 PROCEDURE — 99024 POSTOP FOLLOW-UP VISIT: CPT | Mod: 24

## 2024-10-09 PROCEDURE — 99213 OFFICE O/P EST LOW 20 MIN: CPT | Mod: 24
